# Patient Record
Sex: MALE | Race: BLACK OR AFRICAN AMERICAN
[De-identification: names, ages, dates, MRNs, and addresses within clinical notes are randomized per-mention and may not be internally consistent; named-entity substitution may affect disease eponyms.]

---

## 2021-09-19 ENCOUNTER — HOSPITAL ENCOUNTER (EMERGENCY)
Dept: HOSPITAL 63 - ER | Age: 46
Discharge: HOME | End: 2021-09-19
Payer: SELF-PAY

## 2021-09-19 VITALS — BODY MASS INDEX: 27.84 KG/M2 | WEIGHT: 216.93 LBS | HEIGHT: 74 IN

## 2021-09-19 VITALS — DIASTOLIC BLOOD PRESSURE: 59 MMHG | SYSTOLIC BLOOD PRESSURE: 111 MMHG
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
## 2021-09-19 DIAGNOSIS — I10: ICD-10-CM

## 2021-09-19 DIAGNOSIS — E78.00: ICD-10-CM

## 2021-09-19 DIAGNOSIS — F10.20: Primary | ICD-10-CM

## 2021-09-19 LAB
ACETAMIN: < 2 MCG/ML (ref 10–30)
ALBUMIN SERPL-MCNC: 4.7 G/DL (ref 3.4–5)
ALBUMIN/GLOB SERPL: 1.3 {RATIO} (ref 1–1.7)
ALP SERPL-CCNC: 96 U/L (ref 46–116)
ALT SERPL-CCNC: 102 U/L (ref 16–63)
AMPHETAMINE/METHAMPHETAMINE: (no result)
ANION GAP SERPL CALC-SCNC: 29 MMOL/L (ref 6–14)
APTT PPP: YELLOW S
AST SERPL-CCNC: 66 U/L (ref 15–37)
BACTERIA #/AREA URNS HPF: 0 /HPF
BARBITURATES UR-MCNC: (no result) UG/ML
BASOPHILS # BLD AUTO: 0 X10^3/UL (ref 0–0.2)
BASOPHILS NFR BLD: 1 % (ref 0–3)
BENZODIAZ UR-MCNC: (no result) UG/L
BILIRUB SERPL-MCNC: 0.6 MG/DL (ref 0.2–1)
BILIRUB UR QL STRIP: (no result)
BUN/CREAT SERPL: 11 (ref 6–20)
CA-I SERPL ISE-MCNC: 13 MG/DL (ref 8–26)
CALCIUM SERPL-MCNC: 10.4 MG/DL (ref 8.5–10.1)
CANNABINOIDS UR-MCNC: (no result) UG/L
CHLORIDE SERPL-SCNC: 97 MMOL/L (ref 98–107)
CO2 SERPL-SCNC: 14 MMOL/L (ref 21–32)
COCAINE UR-MCNC: (no result) NG/ML
CREAT SERPL-MCNC: 1.2 MG/DL (ref 0.7–1.3)
EOSINOPHIL NFR BLD: 0 % (ref 0–3)
EOSINOPHIL NFR BLD: 0 X10^3/UL (ref 0–0.7)
ERYTHROCYTE [DISTWIDTH] IN BLOOD BY AUTOMATED COUNT: 15.6 % (ref 11.5–14.5)
ETHANOL SERPL-MCNC: 38 MG/DL (ref 0–10)
FIBRINOGEN PPP-MCNC: (no result) MG/DL
GFR SERPLBLD BASED ON 1.73 SQ M-ARVRAT: 79.2 ML/MIN
GLOBULIN SER-MCNC: 3.6 G/DL (ref 2.2–3.8)
GLUCOSE SERPL-MCNC: 137 MG/DL (ref 70–99)
GLUCOSE UR STRIP-MCNC: (no result) MG/DL
HCT VFR BLD CALC: 48.1 % (ref 39–53)
HGB BLD-MCNC: 16 G/DL (ref 13–17.5)
HYALINE CASTS #/AREA URNS LPF: (no result) /HPF
LIPASE: 92 U/L (ref 73–393)
LYMPHOCYTES # BLD: 1.3 X10^3/UL (ref 1–4.8)
LYMPHOCYTES NFR BLD AUTO: 20 % (ref 24–48)
MCH RBC QN AUTO: 31 PG (ref 25–35)
MCHC RBC AUTO-ENTMCNC: 33 G/DL (ref 31–37)
MCV RBC AUTO: 94 FL (ref 79–100)
METHADONE SERPL-MCNC: (no result) NG/ML
MONO #: 0.3 X10^3/UL (ref 0–1.1)
MONOCYTES NFR BLD: 6 % (ref 0–9)
NEUT #: 4.6 X10^3UL (ref 1.8–7.7)
NEUTROPHILS NFR BLD AUTO: 73 % (ref 31–73)
NITRITE UR QL STRIP: (no result)
OPIATES UR-MCNC: (no result) NG/ML
PCP SERPL-MCNC: (no result) MG/DL
PLATELET # BLD AUTO: 258 X10^3/UL (ref 140–400)
POTASSIUM SERPL-SCNC: 5 MMOL/L (ref 3.5–5.1)
PROT SERPL-MCNC: 8.3 G/DL (ref 6.4–8.2)
RBC # BLD AUTO: 5.12 X10^6/UL (ref 4.3–5.7)
RBC #/AREA URNS HPF: (no result) /HPF (ref 0–2)
SALIC: 3.2 MG/DL (ref 2.8–20)
SODIUM SERPL-SCNC: 140 MMOL/L (ref 136–145)
SP GR UR STRIP: >=1.03
SQUAMOUS #/AREA URNS LPF: (no result) /LPF
UROBILINOGEN UR-MCNC: 0.2 MG/DL
WBC # BLD AUTO: 6.3 X10^3/UL (ref 4–11)
WBC #/AREA URNS HPF: (no result) /HPF (ref 0–4)

## 2021-09-19 PROCEDURE — 82947 ASSAY GLUCOSE BLOOD QUANT: CPT

## 2021-09-19 PROCEDURE — 99284 EMERGENCY DEPT VISIT MOD MDM: CPT

## 2021-09-19 PROCEDURE — 80053 COMPREHEN METABOLIC PANEL: CPT

## 2021-09-19 PROCEDURE — 80307 DRUG TEST PRSMV CHEM ANLYZR: CPT

## 2021-09-19 PROCEDURE — 81001 URINALYSIS AUTO W/SCOPE: CPT

## 2021-09-19 PROCEDURE — 96375 TX/PRO/DX INJ NEW DRUG ADDON: CPT

## 2021-09-19 PROCEDURE — 36415 COLL VENOUS BLD VENIPUNCTURE: CPT

## 2021-09-19 PROCEDURE — 96365 THER/PROPH/DIAG IV INF INIT: CPT

## 2021-09-19 PROCEDURE — G0480 DRUG TEST DEF 1-7 CLASSES: HCPCS

## 2021-09-19 PROCEDURE — 85025 COMPLETE CBC W/AUTO DIFF WBC: CPT

## 2021-09-19 PROCEDURE — 80329 ANALGESICS NON-OPIOID 1 OR 2: CPT

## 2021-09-19 PROCEDURE — 83690 ASSAY OF LIPASE: CPT

## 2021-09-19 NOTE — PHYS DOC
Adult General


HPI


HPI





Patient is a 45-year-old male presenting for sequelae alcohol dependence.  

Reports he has history of high blood pressure and hypercholesterol for which he 

had stopped taking all medications for due to losing insurance coverage 

recently.  Also admits history of alcohol abuse for past 25 years.  Reports his 

usage has waxed and waned but admits increased life stressors recently causing 

him to increase his frequency and consumption.  Reports he typically drinks of 

vodka, admits he buys small airplane bottles as he knows if he buys a big bottle

he has no self-control.  Admits during history that at times he feels like he 

has no will to live and that he would be better off dead, denies any specific 

plans or homicidal intentions.  Presents today as last drink was over 24 hours 

ago when he feels shaky, confused, weak and nauseous.  He admits having several 

episodes of nonbloody nonbilious emesis.  He is here requesting help today.  

Denies any fever, URI symptoms, chest pain, ripping or tearing sensation in the 

torso, shortness of breath or cough, abdominal pain, bladder or bowel 

incontinence, falls, changes in motor or sensory or neuro function





Review of Systems


Review of Systems


Fourteen body systems of review of systems have been reviewed. See HPI for 

pertinent positives and negative responses, other wise all other systems are 

negative, non-pertinent or non-contributory





Physical Exam


Physical Exam


Constitutional: Well developed, well nourished, no acute distress, non-toxic 

appearance. 


HENT: Normocephalic, atraumatic, bilateral external ears normal, oropharynx 

moist, no oral exudates, nose normal. 


Eyes: PERRLA, EOMI, conjunctiva normal, no discharge.  


Neck: Normal range of motion, no tenderness, supple, no stridor.  


Cardiovascular: Heart rate regular, sinus rhythm, no murmurs rubs or gallops


Lungs & Thorax:  Bilateral breath sounds clear to auscultation 


Abdomen: Bowel sounds normal, soft, no tenderness, no masses, no pulsatile 

masses.  Nonsurgical abdomen, no peritoneal signs


Skin: Warm, dry, no erythema, no rash.  


Back: No tenderness, no CVA tenderness.  


Extremities: No tenderness, no cyanosis, no clubbing, ROM intact, no edema.  


Neurologic: Alert and oriented X 3, grossly normal motor & sensory function, no 

focal deficits noted. 


Psychologic: Affect normal, judgement normal, mood normal.





Current Patient Data


Vital Signs





Vital Signs








  Date Time  Temp Pulse Resp B/P (MAP) Pulse Ox O2 Delivery O2 Flow Rate FiO2


 


9/19/21 08:07 98.1 130 20 111/59 (76) 97 Room Air  








Vital Signs








  Date Time  Temp Pulse Resp B/P (MAP) Pulse Ox O2 Delivery O2 Flow Rate FiO2


 


9/19/21 08:07 98.1 130 20 111/59 (76) 97 Room Air  








Lab Results





Laboratory Tests








Test


 9/19/21


08:00 9/19/21


08:03 9/19/21


09:15


 


White Blood Count 6.3 x10^3/uL   


 


Red Blood Count 5.12 x10^6/uL   


 


Hemoglobin 16.0 g/dL   


 


Hematocrit 48.1 %   


 


Mean Corpuscular Volume 94 fL   


 


Mean Corpuscular Hemoglobin 31 pg   


 


Mean Corpuscular Hemoglobin


Concent 33 g/dL 


 


 





 


Red Cell Distribution Width 15.6 %   


 


Platelet Count 258 x10^3/uL   


 


Neutrophils (%) (Auto) 73 %   


 


Lymphocytes (%) (Auto) 20 %   


 


Monocytes (%) (Auto) 6 %   


 


Eosinophils (%) (Auto) 0 %   


 


Basophils (%) (Auto) 1 %   


 


Neutrophils # (Auto) 4.6 x10^3uL   


 


Lymphocytes # (Auto) 1.3 x10^3/uL   


 


Monocytes # (Auto) 0.3 x10^3/uL   


 


Eosinophils # (Auto) 0.0 x10^3/uL   


 


Basophils # (Auto) 0.0 x10^3/uL   


 


Sodium Level 140 mmol/L   


 


Potassium Level 5.0 mmol/L   


 


Chloride Level 97 mmol/L   


 


Carbon Dioxide Level 14 mmol/L   


 


Anion Gap 29   


 


Blood Urea Nitrogen 13 mg/dL   


 


Creatinine 1.2 mg/dL   


 


Estimated GFR


(Cockcroft-Gault) 79.2 


 


 





 


BUN/Creatinine Ratio 11   


 


Glucose Level 137 mg/dL   


 


Calcium Level 10.4 mg/dL   


 


Total Bilirubin 0.6 mg/dL   


 


Aspartate Amino Transf


(AST/SGOT) 66 U/L 


 


 





 


Alanine Aminotransferase


(ALT/SGPT) 102 U/L 


 


 





 


Alkaline Phosphatase 96 U/L   


 


Total Protein 8.3 g/dL   


 


Albumin 4.7 g/dL   


 


Albumin/Globulin Ratio 1.3   


 


Lipase 92 U/L   


 


Salicylates Level 3.2 mg/dL   


 


Salicylate Last Dose Date Unknown   


 


Salicylate Last Dose Time Unknown   


 


Acetaminophen Level < 2 mcg/mL   


 


Acetaminophen Last Dose Date Unknown   


 


Acetaminophen Last Dose Time Unknown   


 


Ethyl Alcohol Level 38 mg/dL   


 


Glucose (Fingerstick)  147 mg/dL  


 


Urine Collection Type   Unknown 


 


Urine Color   Yellow 


 


Urine Clarity   Hazy 


 


Urine pH   5.0 


 


Urine Specific Gravity   >=1.030 


 


Urine Protein   100 mg/dl 


 


Urine Glucose (UA)   Neg mg/dL 


 


Urine Ketones (Stick)   80 mg/dL 


 


Urine Blood   Mod 


 


Urine Nitrite   Neg 


 


Urine Bilirubin   Neg 


 


Urine Urobilinogen Dipstick   0.2 mg/dL 


 


Urine Leukocyte Esterase   Neg 


 


Urine RBC   1-2 /HPF 


 


Urine WBC   Occ /HPF 


 


Urine Squamous Epithelial


Cells 


 


 Few /LPF 





 


Urine Bacteria   0 /HPF 


 


Urine Hyaline Casts   Occ /HPF 


 


Urine Mucus   Mod /LPF 


 


Urine Opiates Screen   Neg 


 


Urine Methadone Screen   Neg 


 


Urine Barbiturates   Neg 


 


Urine Phencyclidine Screen   Neg 


 


Urine


Amphetamine/Methamphetamine 


 


 Neg 





 


Urine Benzodiazepines Screen   Neg 


 


Urine Cocaine Screen   Neg 


 


Urine Cannabinoids Screen   Neg 


 


Urine Ethyl Alcohol   Pos 








Current Medications








 Medications


  (Trade)  Dose


 Ordered  Sig/Michael


 Route


 PRN Reason  Start Time


 Stop Time Status Last Admin


Dose Admin


 


 Ondansetron HCl


  (Zofran)  4 mg  STK-MED ONCE


 .ROUTE


   9/19/21 07:57


 9/19/21 07:57 DC  





 


 Lorazepam


  (Ativan Inj)  1 mg  1X  ONCE


 IVP


   9/19/21 08:15


 9/19/21 08:16 DC 9/19/21 08:34





 


 Sodium Chloride  1,000 ml @ 


 1,000 mls/hr  1X  ONCE


 IV


   9/19/21 08:15


 9/19/21 09:14 DC 9/19/21 08:33





 


 Folic Acid


  (Folic Acid)  1 mg  1X  ONCE


 PO


   9/19/21 08:15


 9/19/21 08:16 DC 9/19/21 08:34





 


 Thiamine HCl 100


 mg/Sodium Chloride  51 ml @ 


 102 mls/hr  DAILY


 IV


   9/19/21 09:00


    9/19/21 08:34





 


 Sodium Chloride  50 ml @ As


 Directed  STK-MED ONCE


 .ROUTE


   9/19/21 08:23


 9/19/21 08:23 DC  





 


 Thiamine HCl


  (Thiamine Vial)  200 mg  STK-MED ONCE


 IV


   9/19/21 08:23


 9/19/21 08:23 DC  














EKG


EKG


[]





Radiology/Procedures


Radiology/Procedures


[]





Heart Score


C/O Chest Pain:  No


Risk Factors:


Risk Factors:  DM, Current or recent (<one month) smoker, HTN, HLP, family 

history of CAD, obesity.


Risk Scores:


Risk Factors:  DM, Current or recent (<one month) smoker, HTN, HLP, family 

history of CAD, obesity.





Course & Med Decision Making


Course & Med Decision Making


ABCs unremarkable


HPI physical exam and comprehensive ER work-up nonconcerning for any emergent or

 surgical issues


Patient likely suffering from mild withdrawals from alcohol as he has not drank 

in last 24 hours, he is a never experienced seizures or full withdrawal such as 

delirium tremens


Patient endorsed sad thoughts and feelings that he would be better off dead and 

so, PAT team came and evaluated patient


Patient evaluated by qualified mental health professional who reviewed any 

appropriate supporting documentation and previous available medical records and 

feels patient does not meet criteria for admission to a mental health facility.


A safety plan was created which patient fully brought into and he was instructed

 to contact their services tomorrow for continued close outpatient follow-up and

 plans to attend outpatient alcohol detox.


Patient educated on importance of continued supportive care and discussed that 

abrupt cessation from alcohol can be life-threatening.  As such, short-term plan

 of care discussed with strict return precautions that were verbally understood 

by patient, all questions and concerns addressed prior to departure





Dragon Disclaimer


Dragon Disclaimer


This electronic medical record was generated, in whole or in part, using a voice

 recognition dictation system.





Departure


Departure:


Impression:  


   Primary Impression:  


   EtOH dependence


Disposition:  01 HOME / SELF CARE / HOMELESS


Condition:  STABLE


Referrals:  


PCPCHANO (PCP)





Additional Instructions:  


You were seen for alcohol dependence.  Your vitals, physical examination and 

comprehensive ER work-up was nonconcerning for any emergent or surgical issues. 

 You were seen by our behavioral health team and safety plan was created, it is 

pertinent that you follow through with this and contact them tomorrow as 

instructed.  As abrupt cessation of alcohol can be fatal, you should refer to 

the resource sheet for help in stopping your addiction with help in a controlled

 environment. You should return to the ED if you develop any new or concerning 

symptoms.











JAMES MARCUS DO                 Sep 19, 2021 07:56

## 2021-12-26 ENCOUNTER — HOSPITAL ENCOUNTER (INPATIENT)
Dept: HOSPITAL 63 - ER | Age: 46
LOS: 1 days | Discharge: HOME | DRG: 313 | End: 2021-12-27
Attending: HOSPITALIST | Admitting: HOSPITALIST
Payer: SELF-PAY

## 2021-12-26 VITALS — SYSTOLIC BLOOD PRESSURE: 174 MMHG | DIASTOLIC BLOOD PRESSURE: 110 MMHG

## 2021-12-26 VITALS — DIASTOLIC BLOOD PRESSURE: 107 MMHG | SYSTOLIC BLOOD PRESSURE: 162 MMHG

## 2021-12-26 VITALS — SYSTOLIC BLOOD PRESSURE: 167 MMHG | DIASTOLIC BLOOD PRESSURE: 111 MMHG

## 2021-12-26 VITALS — WEIGHT: 232.59 LBS | BODY MASS INDEX: 30.83 KG/M2 | HEIGHT: 73 IN

## 2021-12-26 VITALS — DIASTOLIC BLOOD PRESSURE: 109 MMHG | SYSTOLIC BLOOD PRESSURE: 163 MMHG

## 2021-12-26 DIAGNOSIS — E11.9: ICD-10-CM

## 2021-12-26 DIAGNOSIS — I24.8: ICD-10-CM

## 2021-12-26 DIAGNOSIS — R07.89: Primary | ICD-10-CM

## 2021-12-26 DIAGNOSIS — F32.A: ICD-10-CM

## 2021-12-26 DIAGNOSIS — Z59.7: ICD-10-CM

## 2021-12-26 DIAGNOSIS — Z91.14: ICD-10-CM

## 2021-12-26 DIAGNOSIS — E78.5: ICD-10-CM

## 2021-12-26 DIAGNOSIS — I15.8: ICD-10-CM

## 2021-12-26 DIAGNOSIS — K21.9: ICD-10-CM

## 2021-12-26 DIAGNOSIS — F17.210: ICD-10-CM

## 2021-12-26 DIAGNOSIS — F41.8: ICD-10-CM

## 2021-12-26 DIAGNOSIS — Z82.49: ICD-10-CM

## 2021-12-26 DIAGNOSIS — Z91.19: ICD-10-CM

## 2021-12-26 DIAGNOSIS — M19.90: ICD-10-CM

## 2021-12-26 DIAGNOSIS — F41.9: ICD-10-CM

## 2021-12-26 DIAGNOSIS — F10.20: ICD-10-CM

## 2021-12-26 DIAGNOSIS — K29.20: ICD-10-CM

## 2021-12-26 DIAGNOSIS — Z20.822: ICD-10-CM

## 2021-12-26 LAB
ALBUMIN SERPL-MCNC: 4.3 G/DL (ref 3.4–5)
ALP SERPL-CCNC: 79 U/L (ref 46–116)
ALT SERPL-CCNC: 58 U/L (ref 16–63)
AMPHETAMINE/METHAMPHETAMINE: (no result)
ANION GAP SERPL CALC-SCNC: 25 MMOL/L (ref 6–14)
APTT PPP: YELLOW S
AST SERPL-CCNC: 53 U/L (ref 15–37)
BACTERIA #/AREA URNS HPF: 0 /HPF
BARBITURATES UR-MCNC: (no result) UG/ML
BASOPHILS # BLD AUTO: 0 X10^3/UL (ref 0–0.2)
BASOPHILS NFR BLD: 1 % (ref 0–3)
BENZODIAZ UR-MCNC: (no result) UG/L
BILIRUB DIRECT SERPL-MCNC: 0.1 MG/DL (ref 0–0.2)
BILIRUB SERPL-MCNC: 0.6 MG/DL (ref 0.2–1)
BILIRUB UR QL STRIP: (no result)
CA-I SERPL ISE-MCNC: 24 MG/DL (ref 8–26)
CALCIUM SERPL-MCNC: 8.6 MG/DL (ref 8.5–10.1)
CANNABINOIDS UR-MCNC: (no result) UG/L
CHLORIDE SERPL-SCNC: 98 MMOL/L (ref 98–107)
CHOLEST/HDLC SERPL: 7 {RATIO}
CO2 SERPL-SCNC: 16 MMOL/L (ref 21–32)
COCAINE UR-MCNC: (no result) NG/ML
CREAT SERPL-MCNC: 1.3 MG/DL (ref 0.7–1.3)
EOSINOPHIL NFR BLD: 0 % (ref 0–3)
EOSINOPHIL NFR BLD: 0 X10^3/UL (ref 0–0.7)
ERYTHROCYTE [DISTWIDTH] IN BLOOD BY AUTOMATED COUNT: 14.6 % (ref 11.5–14.5)
FIBRINOGEN PPP-MCNC: CLEAR MG/DL
GFR SERPLBLD BASED ON 1.73 SQ M-ARVRAT: 71.9 ML/MIN
GLUCOSE SERPL-MCNC: 148 MG/DL (ref 70–99)
GLUCOSE UR STRIP-MCNC: (no result) MG/DL
HCT VFR BLD CALC: 49.5 % (ref 39–53)
HDLC SERPL-MCNC: 47 MG/DL (ref 40–60)
HGB BLD-MCNC: 16.3 G/DL (ref 13–17.5)
HYALINE CASTS #/AREA URNS LPF: (no result) /HPF
LDLC: (no result) MG/DL (ref 0–100)
LIPASE: 109 U/L (ref 73–393)
LYMPHOCYTES # BLD: 2.4 X10^3/UL (ref 1–4.8)
LYMPHOCYTES NFR BLD AUTO: 33 % (ref 24–48)
MAGNESIUM SERPL-MCNC: 2.3 MG/DL (ref 1.8–2.4)
MCH RBC QN AUTO: 30 PG (ref 25–35)
MCHC RBC AUTO-ENTMCNC: 33 G/DL (ref 31–37)
MCV RBC AUTO: 91 FL (ref 79–100)
METHADONE SERPL-MCNC: (no result) NG/ML
MONO #: 0.6 X10^3/UL (ref 0–1.1)
MONOCYTES NFR BLD: 9 % (ref 0–9)
NEUT #: 4.2 X10^3UL (ref 1.8–7.7)
NEUTROPHILS NFR BLD AUTO: 58 % (ref 31–73)
NITRITE UR QL STRIP: (no result)
OPIATES UR-MCNC: (no result) NG/ML
PCP SERPL-MCNC: (no result) MG/DL
PLATELET # BLD AUTO: 211 X10^3/UL (ref 140–400)
POTASSIUM SERPL-SCNC: 4.4 MMOL/L (ref 3.5–5.1)
PROT SERPL-MCNC: 8.5 G/DL (ref 6.4–8.2)
RBC # BLD AUTO: 5.42 X10^6/UL (ref 4.3–5.7)
RBC #/AREA URNS HPF: (no result) /HPF (ref 0–2)
SODIUM SERPL-SCNC: 139 MMOL/L (ref 136–145)
SP GR UR STRIP: >=1.03
SQUAMOUS #/AREA URNS LPF: (no result) /LPF
TRIGL SERPL-MCNC: 519 MG/DL (ref 0–150)
UROBILINOGEN UR-MCNC: 0.2 MG/DL
VLDLC: 103 MG/DL (ref 0–40)
WBC # BLD AUTO: 7.2 X10^3/UL (ref 4–11)
WBC #/AREA URNS HPF: (no result) /HPF (ref 0–4)

## 2021-12-26 PROCEDURE — 85730 THROMBOPLASTIN TIME PARTIAL: CPT

## 2021-12-26 PROCEDURE — U0003 INFECTIOUS AGENT DETECTION BY NUCLEIC ACID (DNA OR RNA); SEVERE ACUTE RESPIRATORY SYNDROME CORONAVIRUS 2 (SARS-COV-2) (CORONAVIRUS DISEASE [COVID-19]), AMPLIFIED PROBE TECHNIQUE, MAKING USE OF HIGH THROUGHPUT TECHNOLOGIES AS DESCRIBED BY CMS-2020-01-R: HCPCS

## 2021-12-26 PROCEDURE — 85610 PROTHROMBIN TIME: CPT

## 2021-12-26 PROCEDURE — 80307 DRUG TEST PRSMV CHEM ANLYZR: CPT

## 2021-12-26 PROCEDURE — 83880 ASSAY OF NATRIURETIC PEPTIDE: CPT

## 2021-12-26 PROCEDURE — 87426 SARSCOV CORONAVIRUS AG IA: CPT

## 2021-12-26 PROCEDURE — 84484 ASSAY OF TROPONIN QUANT: CPT

## 2021-12-26 PROCEDURE — 85025 COMPLETE CBC W/AUTO DIFF WBC: CPT

## 2021-12-26 PROCEDURE — 84443 ASSAY THYROID STIM HORMONE: CPT

## 2021-12-26 PROCEDURE — 96375 TX/PRO/DX INJ NEW DRUG ADDON: CPT

## 2021-12-26 PROCEDURE — 80061 LIPID PANEL: CPT

## 2021-12-26 PROCEDURE — 96361 HYDRATE IV INFUSION ADD-ON: CPT

## 2021-12-26 PROCEDURE — 82550 ASSAY OF CK (CPK): CPT

## 2021-12-26 PROCEDURE — 71045 X-RAY EXAM CHEST 1 VIEW: CPT

## 2021-12-26 PROCEDURE — 36415 COLL VENOUS BLD VENIPUNCTURE: CPT

## 2021-12-26 PROCEDURE — 96374 THER/PROPH/DIAG INJ IV PUSH: CPT

## 2021-12-26 PROCEDURE — 81001 URINALYSIS AUTO W/SCOPE: CPT

## 2021-12-26 PROCEDURE — 93005 ELECTROCARDIOGRAM TRACING: CPT

## 2021-12-26 PROCEDURE — 82150 ASSAY OF AMYLASE: CPT

## 2021-12-26 PROCEDURE — 80076 HEPATIC FUNCTION PANEL: CPT

## 2021-12-26 PROCEDURE — 83735 ASSAY OF MAGNESIUM: CPT

## 2021-12-26 PROCEDURE — 80048 BASIC METABOLIC PNL TOTAL CA: CPT

## 2021-12-26 PROCEDURE — 96372 THER/PROPH/DIAG INJ SC/IM: CPT

## 2021-12-26 PROCEDURE — 85379 FIBRIN DEGRADATION QUANT: CPT

## 2021-12-26 PROCEDURE — 83690 ASSAY OF LIPASE: CPT

## 2021-12-26 SDOH — ECONOMIC STABILITY - INCOME SECURITY: INSUFFICIENT SOCIAL INSURANCE AND WELFARE SUPPORT: Z59.7

## 2021-12-26 NOTE — PHYS DOC
Past History


Past Medical History:  Alcoholism, Anxiety, Arthritis, GERD, Hypertension


 (RUSTY KELLOGG MD)


Past Surgical History:  No Surgical History


 (RUSTY KELLOGG MD)


Smoking:  Cigarettes


Alcohol Use:  Heavy


 (RUSTY KELLOGG MD)





General Adult


EDM:


Chief Complaint:  CHEST PAIN





HPI:


HPI:


"  I having a lot of chest tightness... "





Patient is a 46 year old male who presents with above hx  and complaints of 

chest discomfort.  Patient states discomfort is spread over the center of his 

chest.  Does not seem to radiate into the neck or arms.  Patient states he is 

very dyspneic and nauseated this morning.  Patient does not admit to drinking 

through the holidays..  Patient does have a history of alcohol dependence has 

past medical history of hypertension, alcohol withdrawal, GERD, borderline 

diabetic, anxiety and depression.  Patient not currently following with any 

local doctors.  No recent travel.  No specific ill contacts.  No history of bad 

food intake.


 (RUSTY KELLOGG MD)





Review of Systems:


Review of Systems:


Constitutional:  Denies fever or chills 


Eyes:  Denies change in visual acuity 


HENT:  Denies nasal congestion or sore throat 


Respiratory: Complains of cough and shortness of breath 


Cardiovascular: Complains of chest pain 


GI:  Denies abdominal pain, nausea, vomiting, bloody stools or diarrhea 


: Denies dysuria 


Musculoskeletal:  Denies back pain or joint pain 


Integument:  Denies rash 


Neurologic:  Denies headache, focal weakness or sensory changes 


Endocrine:  Denies polyuria or polydipsia 


Lymphatic:  Denies swollen glands 


Psychiatric:  Denies depression or anxiety


 (RUSTY KELLOGG MD)





Family History:


Family History:


Noncontributory to presentation


 (RUSTY KELLOGG MD)





Current Medications:


Current Meds:


See nursing for home meds


 (RUSTY KELLOGG MD)





Allergies:


Allergies:





Allergies








Coded Allergies Type Severity Reaction Last Updated Verified


 


  No Known Drug Allergies    21 No








 (RUSTY KELLOGG MD)





Physical Exam:


PE:





Constitutional: Moderate acute distress, moderate intoxication in appearance. []


HENT: Normocephalic, atraumatic, bilateral external ears normal, oropharynx 

moist, no oral exudates, nose normal. []


Eyes: PERRLA, EOMI, conjunctiva normal, no discharge. [] 


Neck: Normal range of motion, no tenderness, supple, no stridor. [] 


Cardiovascular: Tachycardia heart rate regular rhythm, no murmur [.  Bedside 

monitor shows a sinus tachycardia with some J-point elevation]


Lungs & Thorax:  Bilateral breath sounds equal apex with scattered wheezes and 

basilar crackles auscultation []


Abdomen: Bowel sounds normal, soft, no tenderness, no masses, no pulsatile 

masses. [] 


Skin: Warm, diaphoretic, no erythema, no rash. [] 


Back: No tenderness, no CVA tenderness. [] 


Extremities: No tenderness, no cyanosis, no clubbing, ROM intact, ankle edema.  

No cording appreciated


Neurologic: Alert and oriented X 3, normal motor function, normal sensory 

function, no focal deficits noted. []


Psychologic: Affect anxious , judgement normal, mood normal. []


 (RUSTY KELLOGG MD)





EKG:


EKG:


My interpretation EKG shows a sinus tachycardia 118 bpm.  Does have anterior 

septal contour changes and T wave abnormality in the anterior lateral leads.  

But no findings of acute STEMI of contralateral changes.  There is some wavering

 baseline because of patient's respiratory effort.  Time of EKG is 0413 hrs. 





My interpretation second EKG shows a sinus tachycardia 114 bpm.  Left atrial 

changes.  There is some mild elevation ST segments in V1 and 2 time of EKG is 

0618 hrs. []


 (RUSTY KELLOGG MD)





Radiology/Procedures:


Radiology/Procedures:


[][facility] [address], [place] [address]


                                 [phone]


                                        


                                 IMAGING REPORT





                                     Signed





PATIENT: KATELYN ZAMORA   ACCOUNT: [id number]     MRN#: [id number]


: 1975           LOCATION: ER              AGE: 46


SEX: M                    EXAM DT: 21         ACCESSION#: [id number]


STATUS: REG ER            ORD. PHYSICIAN: RUSTY KELLOGG MD


REASON: Chest pain


PROCEDURE: PORTABLE CHEST 1V





AP chest x-ray





HISTORY: Chest pain.





FINDINGS: Heart size normal. Mediastinal silhouette is normal. No pneumothorax, 

pulmonary opacities or pleural effusions. Bones are unremarkable.





IMPRESSION: No acute process.





Electronically signed by: Moises Coppola MD (2021 5:07 AM) [facility]














DICTATED AND SIGNED BY:     MOISES COPPOLA MD


DATE:     21 0506





CC: RUSTY KELLOGG MD; PCP,NO ~MTH0 0


 (RUSTY KELLOGG MD)





Heart Score:


C/O Chest Pain:  Yes


HEART Score for Chest Pain:  








HEART Score for Chest Pain Response (Comments) Value


 


History Moderately Suspicious 1


 


ECG Nonspecific Repolarizatio 1


 


Age >45 - < 65 1


 


Risk Factors >3 Risk Factors or Hx CAD 2


 


Troponin >1-<3x Normal Limit 1


 


Total  6








Risk Factors:


Risk Factors:  DM, Current or recent (<one month) smoker, HTN, HLP, family 

history of CAD, obesity.


Risk Scores:


Score 0 - 3:  2.5% MACE over next 6 weeks - Discharge Home


Score 4 - 6:  20.3% MACE over next 6 weeks - Admit for Clinical Observation


Score 7 - 10:  72.7% MACE over next 6 weeks - Early Invasive Strategies


 (RUSTY KELLOGG MD)





Course & Med Decision Making:


Course & Med Decision Making


Pertinent Labs and Imaging studies reviewed. (See chart for details)








Discussed presentation, testing and tx plan with Dr. Boswell.   Pt. admit to his 

service.  ED Hold until tele bed.  Open in [facility].  Asked  to have 

cardiology see patient in ED on the admission orders





Impression:





1. Chest Pain


2.  Accelerated HTN


3.  Elevated Trop  70


4.  DM - Gluc 148


5.  Alcohol Abuse  - currently 72


6.  Tobacco Abuse


7.  Elevated D-dimer 0.58


8.  Elevated 





Endorsed to Dr. Marcus at shift change.   Urine Tox screen pending. COVID screen 

pending.





Critical Care- 60 min.  Multiple calls, orders for acute MI








Endorsed to Dr. Macrus at shift change:


[]


 (RUSTY KELLOGG MD)


Course & Med Decision Making


I assumed care of patient after comprehensive signout from off going physician. 

 I reviewed entirety of ER work-up so far in a patient who was previously 

admitted under the care of Dr. Boswell


No acute events during my daytime shift.  Patient experiencing alcohol 

withdrawals and responded to IV Ativan.  Dr. Avery, cardiologist, saw patient

 while in ER with no indication for emergent cath etc.


Patient ultimately transferred to [facility] for inpatient admission via 

EMS


 (JAMES MARCUS DO)


Nelson Disclaimer:


Dragon Disclaimer:


This electronic medical record was generated, in whole or in part, using a voice

 recognition dictation system.


 (RUSTY KELLOGG MD)





Departure


Departure:


Impression:  


   Primary Impression:  


   Chest pain


   Additional Impression:  


   Alcohol dependence


Disposition:  09 ADMITTED AS INPATIENT


Admitting Physician:  Sunil Boswell


 (JAMES MARCUS DO)


Condition:  STABLE


Referrals:  


PCP,NO (PCP)


Scripts


No Active Prescriptions or Reported Meds





Dragon Disclaimer


This chart was dictated in whole or in part using Voice Recognition software in 

a busy, high-work load, and often noisy Emergency Department environment.  It 

may contain unintended and wholly unrecognized errors or omissions.


 (RUSTY KELLOGG MD)





Dragon Disclaimer


This chart was dictated in whole or in part using Voice Recognition software in 

a busy, high-work load, and often noisy Emergency Department environment.  It 

may contain unintended and wholly unrecognized errors or omissions.


 (RUSTY KELLOGG MD)





Dragon Disclaimer


This chart was dictated in whole or in part using Voice Recognition software in 

a busy, high-work load, and often noisy Emergency Department environment.  It 

may contain unintended and wholly unrecognized errors or omissions.


 (RUSTY KELLOGG MD)











RUSTY KELLOGG MD           Dec 26, 2021 04:07


JAMES MARCUS DO                 Dec 29, 2021 06:11

## 2021-12-26 NOTE — RAD
AP chest x-ray



HISTORY: Chest pain.



FINDINGS: Heart size normal. Mediastinal silhouette is normal. No pneumothorax, pulmonary opacities o
r pleural effusions. Bones are unremarkable.



IMPRESSION: No acute process.



Electronically signed by: Oswaldo Coppola MD (12/26/2021 5:07 AM) [facility]

## 2021-12-26 NOTE — EKG
[facility] [address], [place] [address]

Test Date:    2021               Test Time:    04:13:19

Pat Name:     KATELYN ZAMORA           Department:   

Patient ID:   [id number]           Room:          

Gender:       M                        Technician:   

:          1975               Requested By: RUSTY KELLOGG

Order Number: [id number]            Reading MD:   Emmanuel Grey MD

                                 Measurements

Intervals                              Axis          

Rate:         118                      P:            82

IL:           154                      QRS:          19

QRSD:         92                       T:            37

QT:           328                                    

QTc:          462                                    

                           Interpretive Statements

SINUS TACHYCARDIA

NON-SPECIFIC ST/T CHANGES

Electronically Signed On 2021 9:26:53 CST by Emmanuel Grey MD

## 2021-12-26 NOTE — NUR
PATIENT ARRIVED TO UNIT VIA EMS. PT IS ORIENTED TO ROOM AND PROCEDURES. PT IS OFFERED FOOD 
AND DRINK. PT IS RESTING IN BED AT THIS TIME. DR OLIVEIRA NOTIFIED OF ADMISSION AND VS OBTAINED. 
WILL CONTINUE TO MONITOR.

## 2021-12-26 NOTE — PDOC2
CONSULT


DOS:


DATE: 12/26/21 


TIME: 14:44


Reason for Consult:


Chest pain


Referring Physician:


Dr. Boswell


Chief Complaint


Chest pain


Source:  Chart review, Patient


Problem List


Problems


Medical Problems:


(1) Chest pain


Status: Acute  








History of Present Illness


46-year-old male with history of hypertension and alcohol abuse presented with 

retrosternal chest pain that he described as sharp in nature without any radi

ation that started after he had nausea and vomiting.  He did admit to drinking 

heavy amounts of alcohol preceding this episode.  He denied any orthopnea/PND, 

palpitations or syncope.  He has history of hypertension and was previously 

taking multiple antihypertensives but stopped taking them after he lost 

insurance.


Past Medical History


Chronic alcohol abuse


Hypertension


Hyperlipidemia


Anxiety disorder


GERD


Past Surgical History:  No pertinent history


Family History


Hypertension


Social History


Patient has history of heavy alcohol abuse and smokes 3/4th pack of cigarettes 

daily and denied any drug abuse.


Current Medications





Current Medications


Aspirin (Aspirin Chewable) 324 mg 1X  ONCE PO  Last administered on 12/26/21at 

04:15;  Start 12/26/21 at 04:15;  Stop 12/26/21 at 04:16;  Status DC


Lactated Ringer's 1,000 ml @  100 mls/hr Q10H IV  Last administered on 

12/26/21at 04:15;  Start 12/26/21 at 04:15;  Stop 12/26/21 at 14:14;  Status DC


Clonidine HCl (Catapres) 0.2 mg 1X  ONCE PO  Last administered on 12/26/21at 

04:33;  Start 12/26/21 at 04:15;  Stop 12/26/21 at 04:16;  Status DC


Ondansetron HCl (Zofran) 4 mg STK-MED ONCE .ROUTE ;  Start 12/26/21 at 04:16;  

Stop 12/26/21 at 04:16;  Status DC


Nitroglycerin (Nitro-Bid Oint) 1 inch 1X  ONCE TP  Last administered on 

12/26/21at 04:15;  Start 12/26/21 at 04:15;  Stop 12/26/21 at 04:21;  Status DC


Ondansetron HCl (Zofran) 8 mg 1X  ONCE IVP  Last administered on 12/26/21at 

04:15;  Start 12/26/21 at 04:15;  Stop 12/26/21 at 04:21;  Status DC


Enoxaparin Sodium (Lovenox 100mg Syringe) 100 mg 1X  ONCE SQ  Last administered 

on 12/26/21at 08:05;  Start 12/26/21 at 06:30;  Stop 12/26/21 at 06:37;  Status 

DC


Nitroglycerin (Nitro-Bid Oint) 1 inch 1X  ONCE TP  Last administered on 

12/26/21at 06:30;  Start 12/26/21 at 06:30;  Stop 12/26/21 at 06:37;  Status DC


Aspirin (DoubleBeam Aspirin) 325 mg 1X  ONCE PO  Last administered on 12/26/21at 

06:45;  Start 12/26/21 at 06:45;  Stop 12/26/21 at 06:46;  Status DC


Ondansetron HCl (Zofran) 4 mg PRN Q4HRS  PRN IVP NAUSEA/VOMITING;  Start 

12/26/21 at 06:45;  Stop 12/27/21 at 06:44


Morphine Sulfate (Morphine 2mg Syringe) 2 mg PRN Q2HR  PRN IVP PAIN;  Start 

12/26/21 at 06:45;  Stop 12/27/21 at 06:44


Acetaminophen (Tylenol) 650 mg PRN Q4HRS  PRN PO FEVER > 100.3'F;  Start 

12/26/21 at 06:45;  Stop 12/27/21 at 06:44


Albuterol/ Ipratropium (Duoneb) 3 ml RTQID NEB ;  Start 12/26/21 at 08:00;  Stop

12/26/21 at 08:46;  Status DC


Enoxaparin Sodium (Lovenox 100mg Syringe) 100 mg BID  ONCE SQ ;  Start 12/26/21 

at 09:00;  Stop 12/26/21 at 09:01;  Status DC


Nitroglycerin (Nitro-Bid Oint) 1 inch TID  ONCE TP ;  Start 12/26/21 at 09:00;  

Stop 12/26/21 at 09:01;  Status DC


Aspirin (Monae Aspirin) 81 mg DAILY PO ;  Start 12/26/21 at 09:00


Lorazepam (Ativan Inj) 1 mg 1X  ONCE IVP  Last administered on 12/26/21at 08:05;

 Start 12/26/21 at 08:15;  Stop 12/26/21 at 08:16;  Status DC


Multivitamins/ Minerals 10 ml/ Thiamine HCl 100 mg/Folic Acid 1 mg/Sodium 

Chloride 1,011.2 ml  @ 100 mls/ hr DAILY IV ;  Start 12/27/21 at 09:00;  Stop 

1/1/22 at 08:59


Lorazepam (Ativan) 4 mg PRN Q1HR  PRN PO For CIWA 8-14;  Start 12/26/21 at 13:30


Lorazepam (Ativan Inj) 2 mg PRN Q1HR  PRN IV For CIWA 8-14;  Start 12/26/21 at 

13:30


Diphenhydramine HCl (Benadryl) 25 mg PRN Q15MIN  PRN IVP EPS symptoms 2'Haldol 

admin;  Start 12/26/21 at 13:30


Clonidine HCl (Catapres) 0.1 mg PRN Q1HR  PRN PO SBP>180 OR DBP>100, MR X 3 Last

administered on 12/26/21at 14:30;  Start 12/26/21 at 13:30


Allergies:  


Coded Allergies:  


     No Known Drug Allergies (Unverified , 9/19/21)


PSYCHOLOGICAL ROS:  No: Hallucinations


Eyes:  No: Loss of vision


HEENT:  No: Epistaxis


Respiratory:  No: Hemoptysis


Cardiovascular:  yes: Chest Pain


Gastrointestinal:  YES: Nausea, Vomiting; 


   No: Abdominal Pain


Genitourinary:  No: Henaturia


Neurological:  No: Seizures


Skin:  No: Rash


General:  Alert, Oriented X3


HEENT:  Atraumatic


Lungs:  Clear to auscultation


Heart:  Regular rate


Abdomen:  Soft


Extremities:  No edema


Neuro:  Normal speech


Psych/Mental Status:  Mood NL


VITALS





Vital Signs








  Date Time  Temp Pulse Resp B/P (MAP) Pulse Ox O2 Delivery O2 Flow Rate FiO2


 


12/26/21 14:30  117  163/109    


 


12/26/21 14:19 98.7  20  96 Room Air  








Labs





Laboratory Tests








Test


 12/26/21


04:10 12/26/21


05:50 12/26/21


07:56 12/26/21


11:20


 


White Blood Count


 7.2 x10^3/uL


(4.0-11.0) 


 


 





 


Red Blood Count


 5.42 x10^6/uL


(4.30-5.70) 


 


 





 


Hemoglobin


 16.3 g/dL


(13.0-17.5) 


 


 





 


Hematocrit


 49.5 %


(39.0-53.0) 


 


 





 


Mean Corpuscular Volume 91 fL ()    


 


Mean Corpuscular Hemoglobin 30 pg (25-35)    


 


Mean Corpuscular Hemoglobin


Concent 33 g/dL


(31-37) 


 


 





 


Red Cell Distribution Width


 14.6 %


(11.5-14.5) 


 


 





 


Platelet Count


 211 x10^3/uL


(140-400) 


 


 





 


Neutrophils (%) (Auto) 58 % (31-73)    


 


Lymphocytes (%) (Auto) 33 % (24-48)    


 


Monocytes (%) (Auto) 9 % (0-9)    


 


Eosinophils (%) (Auto) 0 % (0-3)    


 


Basophils (%) (Auto) 1 % (0-3)    


 


Neutrophils # (Auto)


 4.2 x10^3uL


(1.8-7.7) 


 


 





 


Lymphocytes # (Auto)


 2.4 x10^3/uL


(1.0-4.8) 


 


 





 


Monocytes # (Auto)


 0.6 x10^3/uL


(0.0-1.1) 


 


 





 


Eosinophils # (Auto)


 0.0 x10^3/uL


(0.0-0.7) 


 


 





 


Basophils # (Auto)


 0.0 x10^3/uL


(0.0-0.2) 


 


 





 


Prothrombin Time


 10.0 SEC


(9.4-11.4) 


 


 





 


Prothromb Time International


Ratio 1.0 (0.9-1.1) 


 


 


 





 


Activated Partial


Thromboplast Time 24 SEC (23-33) 


 


 


 





 


D-Dimer (Cierra)


 0.58 mg/L


(0.00-0.50) 


 


 





 


Sodium Level


 139 mmol/L


(136-145) 


 


 





 


Potassium Level


 4.4 mmol/L


(3.5-5.1) 


 


 





 


Chloride Level


 98 mmol/L


() 


 


 





 


Carbon Dioxide Level


 16 mmol/L


(21-32) 


 


 





 


Anion Gap 25 (6-14)    


 


Blood Urea Nitrogen


 24 mg/dL


(8-26) 


 


 





 


Creatinine


 1.3 mg/dL


(0.7-1.3) 


 


 





 


Estimated GFR


(Cockcroft-Gault) 71.9 


 


 


 





 


Glucose Level


 148 mg/dL


(70-99) 


 


 





 


Calcium Level


 8.6 mg/dL


(8.5-10.1) 


 


 





 


Magnesium Level


 2.3 mg/dL


(1.8-2.4) 


 


 





 


Total Bilirubin


 0.6 mg/dL


(0.2-1.0) 


 


 





 


Direct Bilirubin


 0.1 mg/dL


(0.0-0.2) 


 


 





 


Aspartate Amino Transf


(AST/SGOT) 53 U/L (15-37) 


 


 


 





 


Alanine Aminotransferase


(ALT/SGPT) 58 U/L (16-63) 


 


 


 





 


Alkaline Phosphatase


 79 U/L


() 


 


 





 


Creatine Kinase


 393 U/L


() 


 


 





 


Troponin I High Sensitivity 70 ng/L (4-75)   73 ng/L (4-75)  78 ng/L (4-75) 


 


NT-Pro-B-Type Natriuretic


Peptide 5 pg/mL


(0-124) 


 


 





 


Total Protein


 8.5 g/dL


(6.4-8.2) 


 


 





 


Albumin


 4.3 g/dL


(3.4-5.0) 


 


 





 


Amylase Level


 95 U/L


() 


 


 





 


Lipase


 109 U/L


() 


 


 





 


Thyroid Stimulating Hormone


(TSH) 1.451 uIU/mL


(0.358-3.740) 


 


 





 


Ethyl Alcohol Level


 72 mg/dL


(0-10) 


 


 





 


Urine Collection Type  Unknown   


 


Urine Color  Yellow   


 


Urine Clarity  Clear   


 


Urine pH  5.5   


 


Urine Specific Gravity  >=1.030   


 


Urine Protein


 


 100 mg/dl


(NEG-TRACE) 


 





 


Urine Glucose (UA)


 


 Neg mg/dL


(NEG) 


 





 


Urine Ketones (Stick)  80 mg/dL (NEG)   


 


Urine Blood  Mod (NEG)   


 


Urine Nitrite  Neg (NEG)   


 


Urine Bilirubin  Neg (NEG)   


 


Urine Urobilinogen Dipstick


 


 0.2 mg/dL (0.2


mg/dL) 


 





 


Urine Leukocyte Esterase  Neg (NEG)   


 


Urine RBC  3-5 /HPF (0-2)   


 


Urine WBC  1-4 /HPF (0-4)   


 


Urine Squamous Epithelial


Cells 


 Mod /LPF 


 


 





 


Urine Bacteria  0 /HPF (0-FEW)   


 


Urine Hyaline Casts  Few /HPF   


 


Urine Mucus  Slight /LPF   


 


Urine Opiates Screen  Neg (NEG)   


 


Urine Methadone Screen  Neg (NEG)   


 


Urine Barbiturates  Neg (NEG)   


 


Urine Phencyclidine Screen  Neg (NEG)   


 


Urine


Amphetamine/Methamphetamine 


 Neg (NEG) 


 


 





 


Urine Benzodiazepines Screen  Neg (NEG)   


 


Urine Cocaine Screen  Neg (NEG)   


 


Urine Cannabinoids Screen  Neg (NEG)   


 


Urine Ethyl Alcohol  Pos (NEG)   


 


Test


 12/26/21


12:25 


 


 





 


SARS-CoV-2 Antigen (Rapid)


 Negative


(NEGATIVE) 


 


 











Assessment/Plan


1.  Chest pain with atypical features, most probably GI etiology.  Slight 

troponin elevation possibly demand ischemia from uncontrolled hypertension.  EKG

showed sinus tachycardia without any acute changes.  Plan for 2D echo and 

ischemic evaluation as an outpatient.


2.  Accelerated hypertension secondary to noncompliance with medications.  Start

amlodipine for better control.


3.  Chronic alcohol abuse: Monitor for withdrawal


Thank you for your consultation











DELL BYRD MD           Dec 26, 2021 14:51

## 2021-12-26 NOTE — EKG
[facility] [address], [place] [address]

Test Date:    2021               Test Time:    06:16:07

Pat Name:     KATELYN ZAMORA           Department:   

Patient ID:   [id number]           Room:          

Gender:       M                        Technician:   LILLY

:          1975               Requested By: RUSTY KELLOGG

Order Number: [id number]            Reading MD:   Emmanuel Grey MD

                                 Measurements

Intervals                              Axis          

Rate:         114                      P:            56

WA:           156                      QRS:          24

QRSD:         90                       T:            51

QT:           338                                    

QTc:          469                                    

                           Interpretive Statements

SINUS TACHYCARDIA

NON-SPECIFIC ST/T CHANGES

Electronically Signed On 2021 9:25:29 CST by Emmanuel Grey MD

## 2021-12-27 VITALS — DIASTOLIC BLOOD PRESSURE: 107 MMHG | SYSTOLIC BLOOD PRESSURE: 160 MMHG

## 2021-12-27 LAB
ANION GAP SERPL CALC-SCNC: 14 MMOL/L (ref 6–14)
BASOPHILS # BLD AUTO: 0 X10^3/UL (ref 0–0.2)
BASOPHILS NFR BLD: 1 % (ref 0–3)
CA-I SERPL ISE-MCNC: 18 MG/DL (ref 8–26)
CALCIUM SERPL-MCNC: 8.8 MG/DL (ref 8.5–10.1)
CHLORIDE SERPL-SCNC: 99 MMOL/L (ref 98–107)
CO2 SERPL-SCNC: 24 MMOL/L (ref 21–32)
CREAT SERPL-MCNC: 1 MG/DL (ref 0.7–1.3)
EOSINOPHIL NFR BLD: 0.1 X10^3/UL (ref 0–0.7)
EOSINOPHIL NFR BLD: 1 % (ref 0–3)
ERYTHROCYTE [DISTWIDTH] IN BLOOD BY AUTOMATED COUNT: 14.2 % (ref 11.5–14.5)
GFR SERPLBLD BASED ON 1.73 SQ M-ARVRAT: 97.3 ML/MIN
GLUCOSE SERPL-MCNC: 140 MG/DL (ref 70–99)
HCT VFR BLD CALC: 45.7 % (ref 39–53)
HGB BLD-MCNC: 15.2 G/DL (ref 13–17.5)
LYMPHOCYTES # BLD: 1.6 X10^3/UL (ref 1–4.8)
LYMPHOCYTES NFR BLD AUTO: 32 % (ref 24–48)
MCH RBC QN AUTO: 30 PG (ref 25–35)
MCHC RBC AUTO-ENTMCNC: 33 G/DL (ref 31–37)
MCV RBC AUTO: 91 FL (ref 79–100)
MONO #: 0.5 X10^3/UL (ref 0–1.1)
MONOCYTES NFR BLD: 9 % (ref 0–9)
NEUT #: 2.8 X10^3UL (ref 1.8–7.7)
NEUTROPHILS NFR BLD AUTO: 57 % (ref 31–73)
PLATELET # BLD AUTO: 149 X10^3/UL (ref 140–400)
POTASSIUM SERPL-SCNC: 3.5 MMOL/L (ref 3.5–5.1)
RBC # BLD AUTO: 5.03 X10^6/UL (ref 4.3–5.7)
SODIUM SERPL-SCNC: 137 MMOL/L (ref 136–145)
WBC # BLD AUTO: 5 X10^3/UL (ref 4–11)

## 2021-12-27 NOTE — HP
DATE OF SERVICE: 2021

ADMIT DATE: 2021

ATTENDING PHYSICIAN:  Dr. Boswell.



CHIEF COMPLAINT:  Chest pain.



HISTORY OF PRESENT ILLNESS:  The patient is a 46-year-old gentleman admitted to 

the ED with intermittent chest pain, sharp in nature without radiation.  He has 

had some nausea and vomiting.  He drinks alcohol to excess.  His choice is 

vodka.  He also smokes.  In the ED, the workup showed no obvious failure.  There

were some nonspecific EKG changes.  He did have slight elevation of the super 

sensitive cardiac enzymes.  He was seen in consultation and evaluated by 

Cardiology.  They recommended admission to the floor, telemetry monitoring, 

treatment of his blood pressure and stomach and outpatient workup with a stress 

test.



PAST MEDICAL HISTORY:  Significant for hypertension, noncompliance.  He also has

underlying anxiety with depression by my account.



SOCIAL HISTORY:  He is an alcoholic.  He drinks to excess on a daily basis.  He 

smokes a pack of cigarettes daily.



FAMILY HISTORY:  Father  at age 71 of complication of lung and liver 

disease.  Mom is still alive at age 74 in fairly good health.



REVIEW OF SYSTEMS:  He did not have any hematemesis.  He was concerned about the

chest pain.  I reassured him that his symptoms are related to GI issues.  All 

other systems reviewed and turned out to be negative.  He is not on any current 

medications.  He has been prescribed antihypertensive remotely in the past.  He 

does not have a local physician.  He has not been compliant.



PHYSICAL EXAMINATION:

GENERAL:  When I saw him, this is a pleasant, middle-aged gentleman.

VITAL SIGNS:  Initial vital signs showed blood pressure 160/107, pulse is 96 and

regular.  He was afebrile, oxygen saturation 94% on room air.

HEENT:  Head is without trauma.  Pupils are reactive.  Sclerae nonicteric.  

Oropharynx is clear.

NECK:  Supple, no bruits identified.

LUNGS:  Clear.

CARDIOVASCULAR:  Regular heart tones.

ABDOMEN:  Soft.

EXTREMITIES:  Without edema.

NEUROLOGIC FINDINGS:  Focally intact.

SKIN:  Warm and dry.



LABORATORY STUDIES:  The first troponin was 73, second one 78.  Electrolytes 

within normal range.  Hemoglobin 15.2 grams, white count 5000.  Cholesterol 

level was 339 with triglycerides of 519.



ASSESSMENT:

1.  A 46-year-old gentleman with atypical chest pain, most likely GI in nature.

2.  Alcoholic gastritis.

3.  Hyperlipidemia.

4.  Labile hypertension.

5.  Noncompliance of meds.

6.  Underlying depression with anxiety.



PLAN:

1.  Admit to the inpatient unit.

2.  Beta blockade and amlodipine started.

3.  Aspirin daily.

4.  I shall start him on a statin drug.

5.  Strong encouragement to quit alcohol and tobacco use, whether or not he will

remain to be seen.  Formal Cardiology consultation.  They recommended stress 

testing as an outpatient.







REMIGIO/SEBLE

DR: Jasmin   DD: 2021 08:55

DT: 2021 09:16   TID: [id number]

## 2021-12-27 NOTE — NUR
NURSING NOTE



MESSAGE LEFT FOR THIS NURSE TO RETURN CALL TO PT MOTHER. 



THIS RN SPOKE WITH PT THIS AM DURING DISCHARGE EDUCATION ABOUT HIPPA AND SPEAKING WITH IS 
MOTHER AND PT STATED HE WOULD SPEAK TO HIS MOTHER AFTER HE LEAVES. 



THIS RN DID NOT RETURN CALL. 



MATA BAKER.

## 2021-12-27 NOTE — DS
DATE OF DISCHARGE: 12/27/2021

ATTENDING PHYSICIAN:  Dr. Boswell.



FINAL DISCHARGE DIAGNOSES:

1.  Chest pain, noncardiac.

2.  Stress demand ischemia with slight elevation of cardiac enzyme.

3.  Labile hypertension with noncompliance.

4.  Chronic alcoholism.

5.  Tobacco abuse.

6.  Hyperlipidemia.



HISTORY AND PHYSICAL:  The patient is a 46-year-old gentleman with multiple 

medical issues.  He does not have a local doctor.  He is hypertensive.  He 

drinks alcohol on daily basis and smokes cigarettes.  He came in with chest 

pain, which is noncardiac in nature.  There was a slight elevation of cardiac 

enzymes.  Cardiology consultation was obtained.  He is not in any lita failure 

or impending ischemia.  They recommended outpatient workup with a stress test 

and echocardiogram, whether or not that can be accomplished remains to be seen. 

He does not have a primary care physician.  He was admitted and started on blood

pressure meds, aspirin and stomach meds along with cholesterol medication.



PHYSICAL EXAMINATION:  Please see the dictated note.



PERTINENT LABORATORY AND X-RAY STUDIES:  On the database, the enzymes were at 

78, slightly above the normal range.  Electrolytes within normal range.  CBC and

white count were normal.  Cholesterol was markedly elevated at 339 with a 

triglyceride of 519.



COURSE IN THE HOSPITAL:  He was admitted.  We started him on blood pressure 

meds.  He calmed down.  Chest pain subsided. On the second hospital day, 

Cardiology followup recommended outpatient stress test and echocardiogram.  This

will be set up as an outpatient.  He is discharged home with scripts of atenolol

100 mg p.o. daily, I gave him enough for 30 days and 2 refills.  He is 

instructed to find a primary care doctor.  I also recommended Lipitor 40 mg 

daily along with amlodipine 10 mg daily and aspirin 1 daily.  Also a script for 

Nexium 40 mg daily.  Strong encouragement to avoid further alcohol and tobacco 

use, whether or not he will apply his resources to his medication or to his 

alcohol and tobacco remains to be seen.  In any event, he was given explicit 

instructions and followup care.







ALEJANDRO GOMEZ: Jasmin   DD: 12/27/2021 09:01

DT: 12/27/2021 09:37   TID: [id number]

## 2021-12-27 NOTE — PDOC
CARDIO Progress Notes


Date & Time


Date of Service


DATE: 12/27/21 


TIME: 08:14


Time of Evaluation


08:14





Subjective


Notes


CP resolved





Vitals


Vitals





Vital Signs








  Date Time  Temp Pulse Resp B/P (MAP) Pulse Ox O2 Delivery O2 Flow Rate FiO2


 


12/27/21 05:24  96  160/107    


 


12/27/21 04:45 98.0  20  94 Room Air  








Weight


Weight [ ]





Input and Output


I.O.











Intake and Output 


 


 12/27/21





 07:00


 


Intake Total 1360 ml


 


Balance 1360 ml


 


 


 


Intake Oral 1360 ml


 


# Voids 3











Laboratory


Labs





Laboratory Tests








Test


 12/26/21


04:10 12/26/21


05:50 12/26/21


07:52 12/26/21


07:56


 


White Blood Count


 7.2 x10^3/uL


(4.0-11.0) 


 


 





 


Red Blood Count


 5.42 x10^6/uL


(4.30-5.70) 


 


 





 


Hemoglobin


 16.3 g/dL


(13.0-17.5) 


 


 





 


Hematocrit


 49.5 %


(39.0-53.0) 


 


 





 


Mean Corpuscular Volume 91 fL ()    


 


Mean Corpuscular Hemoglobin 30 pg (25-35)    


 


Mean Corpuscular Hemoglobin


Concent 33 g/dL


(31-37) 


 


 





 


Red Cell Distribution Width


 14.6 %


(11.5-14.5) 


 


 





 


Platelet Count


 211 x10^3/uL


(140-400) 


 


 





 


Neutrophils (%) (Auto) 58 % (31-73)    


 


Lymphocytes (%) (Auto) 33 % (24-48)    


 


Monocytes (%) (Auto) 9 % (0-9)    


 


Eosinophils (%) (Auto) 0 % (0-3)    


 


Basophils (%) (Auto) 1 % (0-3)    


 


Neutrophils # (Auto)


 4.2 x10^3uL


(1.8-7.7) 


 


 





 


Lymphocytes # (Auto)


 2.4 x10^3/uL


(1.0-4.8) 


 


 





 


Monocytes # (Auto)


 0.6 x10^3/uL


(0.0-1.1) 


 


 





 


Eosinophils # (Auto)


 0.0 x10^3/uL


(0.0-0.7) 


 


 





 


Basophils # (Auto)


 0.0 x10^3/uL


(0.0-0.2) 


 


 





 


Prothrombin Time


 10.0 SEC


(9.4-11.4) 


 


 





 


Prothromb Time International


Ratio 1.0 (0.9-1.1) 


 


 


 





 


Activated Partial


Thromboplast Time 24 SEC (23-33) 


 


 


 





 


D-Dimer (Cierra)


 0.58 mg/L


(0.00-0.50) 


 


 





 


Sodium Level


 139 mmol/L


(136-145) 


 


 





 


Potassium Level


 4.4 mmol/L


(3.5-5.1) 


 


 





 


Chloride Level


 98 mmol/L


() 


 


 





 


Carbon Dioxide Level


 16 mmol/L


(21-32) 


 


 





 


Anion Gap 25 (6-14)    


 


Blood Urea Nitrogen


 24 mg/dL


(8-26) 


 


 





 


Creatinine


 1.3 mg/dL


(0.7-1.3) 


 


 





 


Estimated GFR


(Cockcroft-Gault) 71.9 


 


 


 





 


Glucose Level


 148 mg/dL


(70-99) 


 


 





 


Calcium Level


 8.6 mg/dL


(8.5-10.1) 


 


 





 


Magnesium Level


 2.3 mg/dL


(1.8-2.4) 


 


 





 


Total Bilirubin


 0.6 mg/dL


(0.2-1.0) 


 


 





 


Direct Bilirubin


 0.1 mg/dL


(0.0-0.2) 


 


 





 


Aspartate Amino Transf


(AST/SGOT) 53 U/L (15-37) 


 


 


 





 


Alanine Aminotransferase


(ALT/SGPT) 58 U/L (16-63) 


 


 


 





 


Alkaline Phosphatase


 79 U/L


() 


 


 





 


Creatine Kinase


 393 U/L


() 


 


 





 


Troponin I High Sensitivity 70 ng/L (4-75)    73 ng/L (4-75) 


 


NT-Pro-B-Type Natriuretic


Peptide 5 pg/mL


(0-124) 


 


 





 


Total Protein


 8.5 g/dL


(6.4-8.2) 


 


 





 


Albumin


 4.3 g/dL


(3.4-5.0) 


 


 





 


Amylase Level


 95 U/L


() 


 


 





 


Lipase


 109 U/L


() 


 


 





 


Thyroid Stimulating Hormone


(TSH) 1.451 uIU/mL


(0.358-3.740) 


 


 





 


Ethyl Alcohol Level


 72 mg/dL


(0-10) 


 


 





 


Urine Collection Type  Unknown   


 


Urine Color  Yellow   


 


Urine Clarity  Clear   


 


Urine pH  5.5   


 


Urine Specific Gravity  >=1.030   


 


Urine Protein


 


 100 mg/dl


(NEG-TRACE) 


 





 


Urine Glucose (UA)


 


 Neg mg/dL


(NEG) 


 





 


Urine Ketones (Stick)  80 mg/dL (NEG)   


 


Urine Blood  Mod (NEG)   


 


Urine Nitrite  Neg (NEG)   


 


Urine Bilirubin  Neg (NEG)   


 


Urine Urobilinogen Dipstick


 


 0.2 mg/dL (0.2


mg/dL) 


 





 


Urine Leukocyte Esterase  Neg (NEG)   


 


Urine RBC  3-5 /HPF (0-2)   


 


Urine WBC  1-4 /HPF (0-4)   


 


Urine Squamous Epithelial


Cells 


 Mod /LPF 


 


 





 


Urine Bacteria  0 /HPF (0-FEW)   


 


Urine Hyaline Casts  Few /HPF   


 


Urine Mucus  Slight /LPF   


 


Urine Opiates Screen  Neg (NEG)   


 


Urine Methadone Screen  Neg (NEG)   


 


Urine Barbiturates  Neg (NEG)   


 


Urine Phencyclidine Screen  Neg (NEG)   


 


Urine


Amphetamine/Methamphetamine 


 Neg (NEG) 


 


 





 


Urine Benzodiazepines Screen  Neg (NEG)   


 


Urine Cocaine Screen  Neg (NEG)   


 


Urine Cannabinoids Screen  Neg (NEG)   


 


Urine Ethyl Alcohol  Pos (NEG)   


 


Triglycerides Level


 


 


 519 mg/dL


(0-150) 





 


Cholesterol Level


 


 


 339 mg/dL


(0-200) 





 


LDL Cholesterol, Calculated    mg/dL (0-100)  


 


VLDL Cholesterol, Calculated


 


 


 103 mg/dL


(0-40) 





 


Non-HDL Cholesterol Calculated


 


 


 292 mg/dL


(0-129) 





 


HDL Cholesterol


 


 


 47 mg/dL


(40-60) 





 


Cholesterol/HDL Ratio   7.0  


 


Test


 12/26/21


11:20 12/26/21


12:25 12/27/21


06:11 





 


Troponin I High Sensitivity 78 ng/L (4-75)    


 


Coronavirus (COVID-19)(PCR)


 


 Not detected


(NOT DETECTD) 


 





 


SARS-CoV-2 Antigen (Rapid)


 


 Negative


(NEGATIVE) 


 





 


White Blood Count


 


 


 5.0 x10^3/uL


(4.0-11.0) 





 


Red Blood Count


 


 


 5.03 x10^6/uL


(4.30-5.70) 





 


Hemoglobin


 


 


 15.2 g/dL


(13.0-17.5) 





 


Hematocrit


 


 


 45.7 %


(39.0-53.0) 





 


Mean Corpuscular Volume   91 fL ()  


 


Mean Corpuscular Hemoglobin   30 pg (25-35)  


 


Mean Corpuscular Hemoglobin


Concent 


 


 33 g/dL


(31-37) 





 


Red Cell Distribution Width


 


 


 14.2 %


(11.5-14.5) 





 


Platelet Count


 


 


 149 x10^3/uL


(140-400) 





 


Neutrophils (%) (Auto)   57 % (31-73)  


 


Lymphocytes (%) (Auto)   32 % (24-48)  


 


Monocytes (%) (Auto)   9 % (0-9)  


 


Eosinophils (%) (Auto)   1 % (0-3)  


 


Basophils (%) (Auto)   1 % (0-3)  


 


Neutrophils # (Auto)


 


 


 2.8 x10^3uL


(1.8-7.7) 





 


Lymphocytes # (Auto)


 


 


 1.6 x10^3/uL


(1.0-4.8) 





 


Monocytes # (Auto)


 


 


 0.5 x10^3/uL


(0.0-1.1) 





 


Eosinophils # (Auto)


 


 


 0.1 x10^3/uL


(0.0-0.7) 





 


Basophils # (Auto)


 


 


 0.0 x10^3/uL


(0.0-0.2) 





 


Sodium Level


 


 


 137 mmol/L


(136-145) 





 


Potassium Level


 


 


 3.5 mmol/L


(3.5-5.1) 





 


Chloride Level


 


 


 99 mmol/L


() 





 


Carbon Dioxide Level


 


 


 24 mmol/L


(21-32) 





 


Anion Gap   14 (6-14)  


 


Blood Urea Nitrogen


 


 


 18 mg/dL


(8-26) 





 


Creatinine


 


 


 1.0 mg/dL


(0.7-1.3) 





 


Estimated GFR


(Cockcroft-Gault) 


 


 97.3 


 





 


Glucose Level


 


 


 140 mg/dL


(70-99) 





 


Calcium Level


 


 


 8.8 mg/dL


(8.5-10.1) 














Physical Exams


HEENT:  Neck Supple W Full Motion


Chest:  Symmetric


Lungs:  Clear to Auscultation


Heart:  RRR


Abdomen:  Soft N/T


Extremities:  No Edema


Neurology:  alert, oriented, follow commands





Assessment


Assessment


1.  Chest pain with atypical features, most probably GI etiology.  Slight 

troponin elevation possibly demand ischemia from uncontrolled hypertension.  EKG

showed sinus tachycardia without any acute changes.  Will plan for outpatient 

ischemic evaluation. Will arranged through [facility] self-pay clinic as 

patient does not have insurance coverage. 


2.  Accelerated hypertension secondary to noncompliance with medications; 

improved, but remains elevated. Add metoprolol


3.  Hyperlipidemia; add statin 


3.  Chronic alcohol abuse: Monitor for withdrawal











DOMINIQUE STEPHENSON           Dec 27, 2021 08:17

## 2021-12-27 NOTE — NUR
NURSING NOTE DISCHARGE



PT DISCHARGED HOME VIA AMBULATION PICKED UP AT THE [facility] BY [facility] TO RIDE TO HIS CAR [place]. 



PT GIVEN EXTENSIVE LIFESTYLE EDUCATION ABOUT RISKS OF ALCOHOL AND SMOKING. PT GIVEN 
EXTENSIVE EDUCATION ABOUT HYPERTENSION AND CHEST PAIN. 



PT GIVEN WRITTEN PRESCRIPTIONS FOR AMLODIPINE, ATENOLOL, ASPIRIN, ATORVASTATIN, AND NEXIUM. 
EDUCATION PROVIDED AND WRITTEN OUT ON DISCHARGE INSTRUCTION PAPERWORK.



PT VERBALIZED UNDERSTANDING.



MATA BAKER.

## 2022-02-13 ENCOUNTER — HOSPITAL ENCOUNTER (INPATIENT)
Dept: HOSPITAL 63 - ER | Age: 47
LOS: 2 days | Discharge: HOME | DRG: 281 | End: 2022-02-15
Attending: INTERNAL MEDICINE | Admitting: INTERNAL MEDICINE
Payer: SELF-PAY

## 2022-02-13 VITALS — DIASTOLIC BLOOD PRESSURE: 96 MMHG | SYSTOLIC BLOOD PRESSURE: 159 MMHG

## 2022-02-13 VITALS — SYSTOLIC BLOOD PRESSURE: 152 MMHG | DIASTOLIC BLOOD PRESSURE: 107 MMHG

## 2022-02-13 VITALS — BODY MASS INDEX: 29.51 KG/M2 | HEIGHT: 73 IN | WEIGHT: 222.67 LBS

## 2022-02-13 VITALS — SYSTOLIC BLOOD PRESSURE: 169 MMHG | DIASTOLIC BLOOD PRESSURE: 114 MMHG

## 2022-02-13 DIAGNOSIS — F10.229: ICD-10-CM

## 2022-02-13 DIAGNOSIS — R79.89: ICD-10-CM

## 2022-02-13 DIAGNOSIS — Z82.49: ICD-10-CM

## 2022-02-13 DIAGNOSIS — R74.01: ICD-10-CM

## 2022-02-13 DIAGNOSIS — M19.90: ICD-10-CM

## 2022-02-13 DIAGNOSIS — F17.210: ICD-10-CM

## 2022-02-13 DIAGNOSIS — E78.5: ICD-10-CM

## 2022-02-13 DIAGNOSIS — I21.A1: Primary | ICD-10-CM

## 2022-02-13 DIAGNOSIS — Z91.14: ICD-10-CM

## 2022-02-13 DIAGNOSIS — Z83.3: ICD-10-CM

## 2022-02-13 DIAGNOSIS — F10.239: ICD-10-CM

## 2022-02-13 DIAGNOSIS — I10: ICD-10-CM

## 2022-02-13 DIAGNOSIS — K21.9: ICD-10-CM

## 2022-02-13 DIAGNOSIS — Z20.822: ICD-10-CM

## 2022-02-13 DIAGNOSIS — F41.9: ICD-10-CM

## 2022-02-13 LAB
ALBUMIN SERPL-MCNC: 4.8 G/DL (ref 3.4–5)
ALP SERPL-CCNC: 87 U/L (ref 46–116)
ALT SERPL-CCNC: 154 U/L (ref 16–63)
AMPHETAMINE/METHAMPHETAMINE: (no result)
ANION GAP SERPL CALC-SCNC: 30 MMOL/L (ref 6–14)
APTT PPP: YELLOW S
AST SERPL-CCNC: 92 U/L (ref 15–37)
BACTERIA #/AREA URNS HPF: 0 /HPF
BARBITURATES UR-MCNC: (no result) UG/ML
BASOPHILS # BLD AUTO: 0.1 X10^3/UL (ref 0–0.2)
BASOPHILS NFR BLD: 1 % (ref 0–3)
BENZODIAZ UR-MCNC: (no result) UG/L
BILIRUB DIRECT SERPL-MCNC: 0.2 MG/DL (ref 0–0.2)
BILIRUB SERPL-MCNC: 1.1 MG/DL (ref 0.2–1)
BILIRUB UR QL STRIP: (no result)
CA-I SERPL ISE-MCNC: 17 MG/DL (ref 8–26)
CALCIUM SERPL-MCNC: 9.6 MG/DL (ref 8.5–10.1)
CANNABINOIDS UR-MCNC: (no result) UG/L
CHLORIDE SERPL-SCNC: 98 MMOL/L (ref 98–107)
CO2 SERPL-SCNC: 16 MMOL/L (ref 21–32)
COCAINE UR-MCNC: (no result) NG/ML
CREAT SERPL-MCNC: 1.2 MG/DL (ref 0.7–1.3)
EOSINOPHIL NFR BLD: 0 % (ref 0–3)
EOSINOPHIL NFR BLD: 0 X10^3/UL (ref 0–0.7)
ERYTHROCYTE [DISTWIDTH] IN BLOOD BY AUTOMATED COUNT: 15.9 % (ref 11.5–14.5)
FIBRINOGEN PPP-MCNC: CLEAR MG/DL
GFR SERPLBLD BASED ON 1.73 SQ M-ARVRAT: 78.9 ML/MIN
GLUCOSE SERPL-MCNC: 97 MG/DL (ref 70–99)
GLUCOSE UR STRIP-MCNC: (no result) MG/DL
HCT VFR BLD CALC: 47.9 % (ref 39–53)
HGB BLD-MCNC: 16.1 G/DL (ref 13–17.5)
INFLUENZA A PATIENT: NEGATIVE
INFLUENZA B PATIENT: NEGATIVE
LYMPHOCYTES # BLD: 2.2 X10^3/UL (ref 1–4.8)
LYMPHOCYTES NFR BLD AUTO: 35 % (ref 24–48)
MAGNESIUM SERPL-MCNC: 1.8 MG/DL (ref 1.8–2.4)
MCH RBC QN AUTO: 30 PG (ref 25–35)
MCHC RBC AUTO-ENTMCNC: 34 G/DL (ref 31–37)
MCV RBC AUTO: 89 FL (ref 79–100)
METHADONE SERPL-MCNC: (no result) NG/ML
MONO #: 0.5 X10^3/UL (ref 0–1.1)
MONOCYTES NFR BLD: 7 % (ref 0–9)
NEUT #: 3.6 X10^3UL (ref 1.8–7.7)
NEUTROPHILS NFR BLD AUTO: 57 % (ref 31–73)
NITRITE UR QL STRIP: (no result)
OPIATES UR-MCNC: (no result) NG/ML
PCP SERPL-MCNC: (no result) MG/DL
PLATELET # BLD AUTO: 250 X10^3/UL (ref 140–400)
POTASSIUM SERPL-SCNC: 4.4 MMOL/L (ref 3.5–5.1)
PROT SERPL-MCNC: 8.6 G/DL (ref 6.4–8.2)
RBC # BLD AUTO: 5.38 X10^6/UL (ref 4.3–5.7)
RBC #/AREA URNS HPF: (no result) /HPF (ref 0–2)
SODIUM SERPL-SCNC: 144 MMOL/L (ref 136–145)
SP GR UR STRIP: >=1.03
SQUAMOUS #/AREA URNS LPF: (no result) /LPF
UROBILINOGEN UR-MCNC: 0.2 MG/DL
WBC # BLD AUTO: 6.4 X10^3/UL (ref 4–11)
WBC #/AREA URNS HPF: (no result) /HPF (ref 0–4)

## 2022-02-13 PROCEDURE — 82947 ASSAY GLUCOSE BLOOD QUANT: CPT

## 2022-02-13 PROCEDURE — 70450 CT HEAD/BRAIN W/O DYE: CPT

## 2022-02-13 PROCEDURE — 82140 ASSAY OF AMMONIA: CPT

## 2022-02-13 PROCEDURE — 96375 TX/PRO/DX INJ NEW DRUG ADDON: CPT

## 2022-02-13 PROCEDURE — 71045 X-RAY EXAM CHEST 1 VIEW: CPT

## 2022-02-13 PROCEDURE — 87428 SARSCOV & INF VIR A&B AG IA: CPT

## 2022-02-13 PROCEDURE — U0003 INFECTIOUS AGENT DETECTION BY NUCLEIC ACID (DNA OR RNA); SEVERE ACUTE RESPIRATORY SYNDROME CORONAVIRUS 2 (SARS-COV-2) (CORONAVIRUS DISEASE [COVID-19]), AMPLIFIED PROBE TECHNIQUE, MAKING USE OF HIGH THROUGHPUT TECHNOLOGIES AS DESCRIBED BY CMS-2020-01-R: HCPCS

## 2022-02-13 PROCEDURE — 80076 HEPATIC FUNCTION PANEL: CPT

## 2022-02-13 PROCEDURE — 80053 COMPREHEN METABOLIC PANEL: CPT

## 2022-02-13 PROCEDURE — 83735 ASSAY OF MAGNESIUM: CPT

## 2022-02-13 PROCEDURE — 85025 COMPLETE CBC W/AUTO DIFF WBC: CPT

## 2022-02-13 PROCEDURE — 80307 DRUG TEST PRSMV CHEM ANLYZR: CPT

## 2022-02-13 PROCEDURE — 96365 THER/PROPH/DIAG IV INF INIT: CPT

## 2022-02-13 PROCEDURE — 85610 PROTHROMBIN TIME: CPT

## 2022-02-13 PROCEDURE — 93306 TTE W/DOPPLER COMPLETE: CPT

## 2022-02-13 PROCEDURE — 80048 BASIC METABOLIC PNL TOTAL CA: CPT

## 2022-02-13 PROCEDURE — 81001 URINALYSIS AUTO W/SCOPE: CPT

## 2022-02-13 PROCEDURE — 93005 ELECTROCARDIOGRAM TRACING: CPT

## 2022-02-13 PROCEDURE — 84484 ASSAY OF TROPONIN QUANT: CPT

## 2022-02-13 PROCEDURE — 36415 COLL VENOUS BLD VENIPUNCTURE: CPT

## 2022-02-13 NOTE — PHYS DOC
Past History


Past Medical History:  Alcoholism, Anxiety, Arthritis, GERD, Hypertension


 (ROCK JACKSON MD)


Past Surgical History:  No Surgical History


 (ROCK JACKSON MD)


Smoking:  Cigarettes


Alcohol Use:  Heavy


 (ROCK JACKSON MD)





Adult General


Chief Complaint


Chief Complaint:  ALCOHOL INTOXICATION





HPI


HPI


Patient is a 46-year-old male who presents with nausea, and vomiting for the 

last half a day or so.  Patient states he is an alcoholic and drinks 

approximately 6-8 hard liquor drinks daily.  States sometimes a little less and 

sometimes a little more.  States that his last drink was about 12 PM to 1 PM 

yesterday afternoon.  States he has had this problem for a while and has been in

rehabs 3 times and has quit temporarily but always comes back.  States he also 

recently started smoking cigarettes again.  States he has had about 6 or 7 

episodes of nonbloody nonbilious emesis and has not eaten anything really in 2 

days or had anything to drink other than some alcohol and a little bit of water.

 States he is still making urine and stool normally for him with no blood in 

either.  Denies recent travel, traumas, illnesses, fevers, chest pain, shortness

of breath, abdominal pain, dysuria, hematuria or diarrhea.  Denies any other 

drug use.


 (ROCK JACKSON MD)





Review of Systems


Review of Systems


Review of systems otherwise unremarkable except noted in HPI


 (ROCK JACKSON MD)





Allergies


Allergies





Allergies








Coded Allergies Type Severity Reaction Last Updated Verified


 


  No Known Drug Allergies    9/19/21 No








 (ROCK JACKSON MD)





Physical Exam


Physical Exam





Constitutional: Well developed, well nourished, no acute distress, non-toxic 

appearance. []


HENT: Normocephalic, atraumatic, bilateral external ears normal, oropharynx 

moist, no oral exudates, nose normal. []


Eyes: PERRLA, EOMI, conjunctiva normal, no discharge. [] 


Neck: Normal range of motion, no tenderness, supple, no stridor. [] 


Cardiovascular:Heart rate regular rhythm, no murmur []


Lungs & Thorax:  Bilateral breath sounds clear to auscultation []


Abdomen: soft, no tenderness, no masses, no pulsatile masses. [] 


Skin: Warm, dry, no erythema, no rash. [] 


Extremities: No tenderness,  ROM intact, no edema. [] 


Neurologic: Alert and oriented X 3, normal motor function, normal sensory 

function, able to sit, stand and walk without issue no focal deficits noted. []


Psychologic: Affect normal, judgement normal, mood normal. []


 (ROCK JACKSON MD)





Current Patient Data


Vital Signs





Vital Signs








  Date Time  Temp Pulse Resp B/P (MAP) Pulse Ox O2 Delivery O2 Flow Rate FiO2


 


2/13/22 03:57 98.1 130 18 159/117 (131) 94 Room Air  








Vital Signs








  Date Time  Temp Pulse Resp B/P (MAP) Pulse Ox O2 Delivery O2 Flow Rate FiO2


 


2/13/22 03:57 98.1 130 18 159/117 (131) 94 Room Air  








Lab Results





Laboratory Tests








Test


 2/13/22


04:20


 


White Blood Count 6.4 x10^3/uL 


 


Red Blood Count 5.38 x10^6/uL 


 


Hemoglobin 16.1 g/dL 


 


Hematocrit 47.9 % 


 


Mean Corpuscular Volume 89 fL 


 


Mean Corpuscular Hemoglobin 30 pg 


 


Mean Corpuscular Hemoglobin


Concent 34 g/dL 





 


Red Cell Distribution Width 15.9 % 


 


Platelet Count 250 x10^3/uL 


 


Neutrophils (%) (Auto) 57 % 


 


Lymphocytes (%) (Auto) 35 % 


 


Monocytes (%) (Auto) 7 % 


 


Eosinophils (%) (Auto) 0 % 


 


Basophils (%) (Auto) 1 % 


 


Neutrophils # (Auto) 3.6 x10^3uL 


 


Lymphocytes # (Auto) 2.2 x10^3/uL 


 


Monocytes # (Auto) 0.5 x10^3/uL 


 


Eosinophils # (Auto) 0.0 x10^3/uL 


 


Basophils # (Auto) 0.1 x10^3/uL 


 


Sodium Level 144 mmol/L 


 


Potassium Level 4.4 mmol/L 


 


Chloride Level 98 mmol/L 


 


Carbon Dioxide Level 16 mmol/L 


 


Anion Gap 30 


 


Blood Urea Nitrogen 17 mg/dL 


 


Creatinine 1.2 mg/dL 


 


Estimated GFR


(Cockcroft-Gault) 78.9 





 


Glucose Level 97 mg/dL 


 


Calcium Level 9.6 mg/dL 


 


Magnesium Level 1.8 mg/dL 


 


Troponin I High Sensitivity 229 ng/L 








Current Medications








 Medications


  (Trade)  Dose


 Ordered  Sig/Michael


 Route


 PRN Reason  Start Time


 Stop Time Status Last Admin


Dose Admin


 


 Lactated Ringer's  1,000 ml @ 


 1,000 mls/hr  1X  ONCE


 IV


   2/13/22 04:30


 2/13/22 05:29 DC 2/13/22 04:30





 


 Thiamine HCl 100


 mg/Sodium Chloride  51 ml @ 


 102 mls/hr  1X  ONCE


 IV


   2/13/22 04:30


 2/13/22 04:59 DC 2/13/22 04:31





 


 Folic Acid


  (Folic Acid)  1 mg  1X  ONCE


 PO


   2/13/22 04:30


 2/13/22 04:31 DC 2/13/22 04:31





 


 Cyanocobalamin


  (Vitamin B-12)  1,000 mcg  1X


 PO


   2/13/22 04:30


 2/13/22 04:14 DC  





 


 Metoclopramide HCl


  (Reglan Vial)  10 mg  1X  ONCE


 IVP


   2/13/22 04:30


 2/13/22 04:31 DC 2/13/22 04:31





 


 Cyanocobalamin


  (Vitamin B-12)  1,000 mcg  1X  ONCE


 PO


   2/13/22 04:30


 2/13/22 04:31 DC 2/13/22 04:42





 


 Sodium Chloride  50 ml @ As


 Directed  STK-MED ONCE


 .ROUTE


   2/13/22 04:28


 2/13/22 04:28 DC  





 


 Thiamine HCl


  (Thiamine Vial)  200 mg  STK-MED ONCE


 IV


   2/13/22 04:28


 2/13/22 04:28 DC  





 


 Lorazepam


  (Ativan Inj)  4 mg  1X  ONCE


 IVP


   2/13/22 04:45


 2/13/22 04:46 DC 2/13/22 04:42





 


 Lactated Ringer's  1,000 ml @ 


 1,000 mls/hr  1X  ONCE


 IV


   2/13/22 06:00


 2/13/22 06:59  2/13/22 06:01





 


 Ceftriaxone


 Sodium 1 gm/


 Sodium Chloride  50 ml @ 


 100 mls/hr  1X  ONCE


 IV


   2/13/22 06:00


 2/13/22 06:29  2/13/22 06:01





 


 Sodium Chloride  50 ml @ As


 Directed  STK-MED ONCE


 .ROUTE


   2/13/22 05:57


 2/13/22 05:58 DC  





 


 Ceftriaxone Sodium


  (Rocephin)  1 gm  STK-MED ONCE


 .ROUTE


   2/13/22 05:57


 2/13/22 05:58 DC  











 (JAMES MARCUS DO)


EKG


EKG


[]


 (ROCK JACKSON MD)


EKG


EKG ordered and interpreted by myself at 0619 hrs. as sinus rhythm at 122 bpm, 

unremarkable intervals, no axis deviation, nonspecific T wave abnormalities in 

lead I with T wave inversion noted in lead 4 through V6, no STEMI


 (JAMES MARCUS DO)


Radiology/Procedures


Radiology/Procedures


[]


 (ROCK JACKSON MD)





Heart Score


C/O Chest Pain:  No


Risk Factors:


Risk Factors:  DM, Current or recent (<one month) smoker, HTN, HLP, family 

history of CAD, obesity.


Risk Scores:


Risk Factors:  DM, Current or recent (<one month) smoker, HTN, HLP, family 

history of CAD, obesity.


 (ROCK JACKSON MD)


C/O Chest Pain:  No


HEART Score for Chest Pain:  








HEART Score for Chest Pain Response (Comments) Value


 


History Moderately Suspicious 1


 


ECG Nonspecific Repolarizatio 1


 


Age >45 - < 65 1


 


Risk Factors                            1 or 2 Risk Factors 1


 


Troponin >1-<3x Normal Limit 1


 


Total  5








 (JAMES MARCUS DO)


Course & Med Decision Making


Course & Med Decision Making


Patient is a 46-year-old male who presents with nausea and vomiting secondary to

 either alcoholism or withdrawal


Vital signs notable for tachycardia and hypertension.  Physical exam noted 

above.  Placed on the monitor with IV access established and IV fluid given.  

Replaced B vitamins and folate.  Given nausea medicine.  Given benzodiazepines 

for alcohol withdrawal.


Laboratory analysis pending imaging pending patient care handed off to day team 

for continued evaluation, treatment and disposition.


 (ROCK JACKSON MD)


Course & Med Decision Making


I assumed care of patient after comprehensive signout from off going physician


I am aware of patient as I have seen him multiple times in the past for alcohol 

withdrawal related visits.  History and presentation consistent with obvious 

alcohol intoxication without reported loss of consciousness and/or trauma


I reviewed entirety of ER work-up most concerning for elevated troponin and 

absence of any actual chest pain.  Reviewing patient's EKGs, does appear patient

 has nonspecific change in lead I and lateral wall T wave inversions


I contacted hospitalist, Dr. Mir and reviewed case.  He felt patient was 

appropriate for admission to [facility] for continued cardiac observation and 

ETOH/CIWA protocol in an alcohol dependence individual


I have updated patient on entirety of ER findings and need for hospitalization 

for which she was amenable.  All questions and concerns addressed prior to 

hospitalization


 (JAMES MARCUS DO)


Dragon Disclaimer


Dragon Disclaimer


This electronic medical record was generated, in whole or in part, using a voice

 recognition dictation system.


 (RCOK JACKSON MD)





Departure


Departure:


Impression:  


   Primary Impression:  


   Alcohol dependence


   Additional Impressions:  


   Alcohol withdrawal


   Elevated troponin


Disposition:  09 ADMITTED AS INPATIENT


Admitting Physician:  Loretta Mir


 (JAMES MARCUS DO)


Condition:  STABLE


Referrals:  


PCP,CHANO (PCP)








ABDI HOLT MD


Patient Instructions:  Alcohol Problems, Alcohol Withdrawal


Scripts


No Active Prescriptions or Reported Meds





Problem Qualifiers











ROCK JACKSON MD               Feb 13, 2022 03:48


JAMES MARCUS DO                 Feb 13, 2022 06:30

## 2022-02-13 NOTE — RAD
CT HEAD/BRAIN WO 



Date: 2/13/2022 5:59 AM 



Clinical Indication: alcohol w/u / Spl. Instructions:  / History: 



Comparison: None.



Technique:  5 mm axial tomographic images were obtained of the head without contrast. These were view
ed on brain and bone windows. One or more of the following dose reduction techniques were utilized: A
utomated exposure control (AEC), Adjustment of mA and/or kV according to patient size, Use of iterati
ve reconstruction technique such as ASiR, CT scan done according to ALARA and image gently/image wise
ly



Findings:



The brain parenchyma is normal in attenuation. No intra- or extra-axial mass or fluid collection. No 
acute hemorrhage. The ventricles are normal in size, shape, and morphology. The gray-white matter roxana
ction is normal. The subarachnoid cisterns are patent. 



The visualized paranasal sinuses are normal.  The visualized portions of the orbits and globes are no
rmal. The mastoid air cells are clear. 



The  topogram shows no lytic lesion or fracture. 



Impression:



No acute intracranial process.



Electronically signed by: Bonifacio Wright MD (2/13/2022 6:22 AM) [facility]

## 2022-02-13 NOTE — HP
DATE OF SERVICE: 2022

ADMIT DATE: 2022

HISTORY OF PRESENT ILLNESS:  The patient is a 46-year-old -American male 

patient who presented to the Emergency Room with nausea, vomiting for the last 

half day or so.  The patient states that he is alcoholic and drinks 

approximately 6-8 hard liquor drinks daily, states sometimes a little less and 

sometimes a little more.  States that this last drink was about 12 p.m. to 1 

p.m. yesterday afternoon and states that he has had this problem for a while and

has been in rehab 3 times and has quit temporarily, but always comes back.  He 

stated that he has also recently started smoking cigarettes again. Stated that 

he had about 6-7 episodes of nonbloody, nonbilious emesis and has not eaten 

anything really in 2 days or had anything to drink other than some alcohol and a

little bit of water. Stated that he is still making urine and stool normally for

him with no blood in either.  Denied any recent travel, trauma, illness, fever, 

chest pain, abdominal pain.  Denies any drug use.  He was extensively 

investigated and has had lab work that were mostly unremarkable except for 

elevated liver enzymes.  His urinalysis was essentially unremarkable.  Toxic 

screen was positive for blood alcohol of ____ mg/dL, negative for all other 

drugs.  His influenza A and B were negative and SARS-CoV-2 antigen rapid testing

was negative.  Did have a CT scan of the head, which basically showed the brain 

parenchyma is normal in attenuation.  No intra or extraaxial mass or fluid 

collection, no acute hemorrhage.  The ventricles are normal in size, shape, and 

morphology.  The gray-white matter junction is normal.  The subarachnoid 

cisterns are patent.  The visualized paranasal sinuses are normal.  The 

visualized portion of the orbits and globes are normal.  The mastoid air cells 

are clear.  The  topogram shows no lytic lesion or fracture.  His chest 

x-ray showed that the patient has no pulmonary masses or consolidation.  The 

tracheobronchial tree and hilar structures are normal.  There is no pleural 

effusion or pneumothorax.  Cardiomediastinal silhouette is normal.  The great 

vessels of thorax are normal.  The patient was admitted with alcohol dependence 

and alcohol withdrawal.  He has also elevated troponin and is known to have 

hypertension for which he has multiple antihypertensive medications, but has not

been taking his medications regularly.  He is also known to have hyperlipidemia.



PAST SURGICAL HISTORY:  Unremarkable.



ALLERGIES:  He has no known drug allergies.



MEDICATIONS:  He stated that he is supposed to be on amlodipine, lisinopril, 

carvedilol and atorvastatin, but he has not been taking them for a while now.



FAMILY HISTORY:  His father  at age of 74.  Mother is still alive and has 

diabetes, hypertension and fatty liver.



SOCIAL HISTORY:  He lives with his fiancee.  He does not have any children.  He 

continued to smoke and drinks between 2-2.5 pints of vodka.  Does not use any 

drugs.  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



REVIEW OF SYSTEMS:  The patient denied any blurring of vision, cataracts, 

glaucoma or macular degeneration.  Denied any earache, tinnitus or sensory 

deafness.  Denied any nosebleed, stuffy nose or postnasal drip.  Denied any sore

throat, sore tongue, toothache, hoarseness of voice or difficulty swallowing.  

However, he did complain of recurrent bouts of nausea, vomiting, but denied any 

hematemesis, melena or hematochezia.  Denied any dysuria, frequency or 

hematuria.  Denied any chest pain, shortness of breath, orthopnea, paroxysmal 

nocturnal dyspnea.  Denied any cough, phlegm or hemoptysis.



PHYSICAL EXAMINATION:

VITAL SIGNS:  On arrival to the Emergency Room, he was somewhat tachycardic with

a heart rate of 133, blood pressure of 159/117, his temperature was 98.1, 

respiratory rate was 18 and oxygen saturation was 94%.

HEAD, EYES, EARS, NOSE, AND THROAT:  Normocephalic, atraumatic.

NECK:  Supple.

HEART:  Showed normal first and second heart sounds.  No gallop, rub or murmur.

CHEST:  Clear to auscultation, no crepitation or rhonchi.

ABDOMEN:  Distended, soft, nontender.

NEUROLOGIC:  He was awake, alert, responding appropriately.  All cranial nerves 

intact.  He moves extremities without difficulty.  He ambulates without 

assistance or assistive devices.



LABORATORY DATA:  His lab work on arrival showed a white cell count of 6400, 

hemoglobin 16, hematocrit 48, MCV 89, platelet count 250,000 with a normal 

manual differential.  Serum sodium was 144, potassium 4.4, chloride 98, 

bicarbonate 16, anion gap of 30, BUN of 17, creatinine 1.2.  Estimated GFR was 

79 mL per minute.  His glucose was 97, calcium was 9.6, magnesium was 1.8.  

Total bilirubin, alkaline phosphatase were normal.  AST, ALT slightly elevated. 

His troponin was high at 229, total protein was 8.6, albumin was 4.8.  

Urinalysis essentially unremarkable.  Toxic screen was positive for blood 

alcohol of ____ mg/dL, was negative for other drugs.



ASSESSMENT AND PLAN:  In summary, this is a 46-year-old -American male 

patient with past medical history significant for alcohol dependence, 

hypertension, gastroesophageal reflux disease, anxiety. He was found to have 

also slightly elevated troponin.  His blood pressure is suboptimally controlled,

likely a manifestation of his withdrawal.  He has received a banana bag and was 

continued on alcohol withdrawal protocol.







SERGIO/BRISEIDA

DR: AMM/nts   DD: 2022 17:07

DT: 2022 17:38   TID: [id number]

## 2022-02-13 NOTE — PDOC
PROVIDER NOTE


Brief Cardiology Progress Note: 


-------------------------


Please refer to cardiac consultation note and recs from December 2021. 





S: 


Mr. Bowman presents again with alcohol abuse issues. Trop mildly elevated in the

setting of withdrawl, HTN and alcohol use. He denies chest pain. EKG is 

non-ischemia. 





O


Cardiovascular:  Normal heart sounds


Respiratory:  Normal breath sounds


Abdomen:  Soft, nontender, normal bowel sounds


Mental Status:  Alert, oriented X 3


Other:   





Labs reviewed/Meds reviewed. 





Impression: 


1. NSTEMI - Type 2


2. Alcohol abuse


3. HTN





Plan:


1. Continue withdrawl protocol


2. Depending on patient discharge plans, if still inpatient on tuesday, obtain 

echo given recurrent admissions to help patient know if he has alcoholic cmp, 

otherwise, patient will need to f/u with PCP and arrange for this on an outpt 

basis. 





Thanks





Justification of Admission:


Justification of Admission:


Justification of Admission Dx:  N/A











KIMO VIEIRA MD       Feb 13, 2022 23:26

## 2022-02-13 NOTE — EKG
[facility] ED

                    [facility] [address] [place] [id number]

Test Date:    2022               Test Time:    06:18:22

Pat Name:     KATELYN ZAMORA           Department:   

Patient ID:   [id number]           Room:          

Gender:       M                        Technician:   

:          [id number]               Requested By: ROCK JACKSON

Order Number: [id number]            Reading MD:   Emmanuel Grey MD

                                 Measurements

Intervals                              Axis          

Rate:         122                      P:            64

NJ:           138                      QRS:          37

QRSD:         86                       T:            37

QT:           312                                    

QTc:          446                                    

                           Interpretive Statements

SINUS TACHYCARDIA

NON-SPECIFIC ST/T CHANGES

Electronically Signed On 2022 13:05:19 CST by Emmanuel Grey MD

## 2022-02-13 NOTE — RAD
XR CHEST 1V 



INDICATION: alcohol w/u  . 



COMPARISON STUDY: 12/26/2021.



FINDINGS:



Lungs: Low lung volume. No pulmonary mass or consolidation. The tracheobronchial tree and hilar struc
tures are normal.



Pleura: No pleural effusion or pneumothorax.



Heart and Mediastinum: The cardiomediastinal silhouette is normal. The great vessels of the thorax ar
e normal.



IMPRESSION:  

No consolidation.



Electronically signed by: Bonifacio Wright MD (2/13/2022 6:24 AM) [facility]

## 2022-02-14 VITALS — DIASTOLIC BLOOD PRESSURE: 124 MMHG | SYSTOLIC BLOOD PRESSURE: 178 MMHG

## 2022-02-14 VITALS — DIASTOLIC BLOOD PRESSURE: 112 MMHG | SYSTOLIC BLOOD PRESSURE: 159 MMHG

## 2022-02-14 VITALS — DIASTOLIC BLOOD PRESSURE: 105 MMHG | SYSTOLIC BLOOD PRESSURE: 148 MMHG

## 2022-02-14 VITALS — SYSTOLIC BLOOD PRESSURE: 159 MMHG | DIASTOLIC BLOOD PRESSURE: 106 MMHG

## 2022-02-14 VITALS — SYSTOLIC BLOOD PRESSURE: 180 MMHG | DIASTOLIC BLOOD PRESSURE: 118 MMHG

## 2022-02-14 VITALS — SYSTOLIC BLOOD PRESSURE: 177 MMHG | DIASTOLIC BLOOD PRESSURE: 127 MMHG

## 2022-02-14 VITALS — DIASTOLIC BLOOD PRESSURE: 122 MMHG | SYSTOLIC BLOOD PRESSURE: 184 MMHG

## 2022-02-14 LAB
ALBUMIN SERPL-MCNC: 3.6 G/DL (ref 3.4–5)
ALBUMIN/GLOB SERPL: 1.3 {RATIO} (ref 1–1.7)
ALP SERPL-CCNC: 64 U/L (ref 46–116)
ALT SERPL-CCNC: 113 U/L (ref 16–63)
ANION GAP SERPL CALC-SCNC: 10 MMOL/L (ref 6–14)
AST SERPL-CCNC: 87 U/L (ref 15–37)
BASOPHILS # BLD AUTO: 0 X10^3/UL (ref 0–0.2)
BASOPHILS NFR BLD: 1 % (ref 0–3)
BILIRUB SERPL-MCNC: 1 MG/DL (ref 0.2–1)
BUN/CREAT SERPL: 9 (ref 6–20)
CA-I SERPL ISE-MCNC: 9 MG/DL (ref 8–26)
CALCIUM SERPL-MCNC: 8.7 MG/DL (ref 8.5–10.1)
CHLORIDE SERPL-SCNC: 107 MMOL/L (ref 98–107)
CO2 SERPL-SCNC: 26 MMOL/L (ref 21–32)
CREAT SERPL-MCNC: 1 MG/DL (ref 0.7–1.3)
EOSINOPHIL NFR BLD: 0.1 X10^3/UL (ref 0–0.7)
EOSINOPHIL NFR BLD: 2 % (ref 0–3)
ERYTHROCYTE [DISTWIDTH] IN BLOOD BY AUTOMATED COUNT: 15.5 % (ref 11.5–14.5)
GFR SERPLBLD BASED ON 1.73 SQ M-ARVRAT: 97.3 ML/MIN
GLOBULIN SER-MCNC: 2.7 G/DL (ref 2.2–3.8)
GLUCOSE SERPL-MCNC: 152 MG/DL (ref 70–99)
HCT VFR BLD CALC: 38.9 % (ref 39–53)
HGB BLD-MCNC: 12.9 G/DL (ref 13–17.5)
LYMPHOCYTES # BLD: 1.3 X10^3/UL (ref 1–4.8)
LYMPHOCYTES NFR BLD AUTO: 34 % (ref 24–48)
MAGNESIUM SERPL-MCNC: 1.6 MG/DL (ref 1.8–2.4)
MCH RBC QN AUTO: 30 PG (ref 25–35)
MCHC RBC AUTO-ENTMCNC: 33 G/DL (ref 31–37)
MCV RBC AUTO: 89 FL (ref 79–100)
MONO #: 0.2 X10^3/UL (ref 0–1.1)
MONOCYTES NFR BLD: 6 % (ref 0–9)
NEUT #: 2.1 X10^3UL (ref 1.8–7.7)
NEUTROPHILS NFR BLD AUTO: 58 % (ref 31–73)
PLATELET # BLD AUTO: 141 X10^3/UL (ref 140–400)
POTASSIUM SERPL-SCNC: 3.6 MMOL/L (ref 3.5–5.1)
PROT SERPL-MCNC: 6.3 G/DL (ref 6.4–8.2)
RBC # BLD AUTO: 4.37 X10^6/UL (ref 4.3–5.7)
SODIUM SERPL-SCNC: 143 MMOL/L (ref 136–145)
WBC # BLD AUTO: 3.7 X10^3/UL (ref 4–11)

## 2022-02-14 RX ADMIN — METOPROLOL TARTRATE SCH MG: 50 TABLET, FILM COATED ORAL at 16:22

## 2022-02-14 RX ADMIN — FOLIC ACID SCH MLS/HR: 5 INJECTION, SOLUTION INTRAMUSCULAR; INTRAVENOUS; SUBCUTANEOUS at 09:22

## 2022-02-14 RX ADMIN — METOPROLOL TARTRATE SCH MG: 50 TABLET, FILM COATED ORAL at 20:13

## 2022-02-14 NOTE — PDOC
DOMINIQUE STEPHENSON APRN 2/14/22 0846:


CARDIO Progress Notes


Date & Time


Date of Service


DATE: 2/14/22 


TIME: 08:40


Time of Evaluation


08:40





Subjective


Notes


no chest pain, shortness of breath.





Vitals


Vitals





Vital Signs








  Date Time  Temp Pulse Resp B/P (MAP) Pulse Ox O2 Delivery O2 Flow Rate FiO2


 


2/14/22 06:42 97.8 81 18 184/122 (142) 98 Room Air  








Weight


Weight [ ]





Input and Output


I.O.











Intake and Output 


 


 2/14/22





 07:00


 


Intake Total 440 ml


 


Output Total 1 ml


 


Balance 439 ml


 


 


 


Intake Oral 440 ml


 


Output Urine Total 1 ml











Laboratory


Labs





Laboratory Tests








Test


 2/13/22


04:20 2/13/22


04:33 2/13/22


06:24 2/13/22


06:45


 


White Blood Count


 6.4 x10^3/uL


(4.0-11.0) 


 


 





 


Red Blood Count


 5.38 x10^6/uL


(4.30-5.70) 


 


 





 


Hemoglobin


 16.1 g/dL


(13.0-17.5) 


 


 





 


Hematocrit


 47.9 %


(39.0-53.0) 


 


 





 


Mean Corpuscular Volume 89 fL ()    


 


Mean Corpuscular Hemoglobin 30 pg (25-35)    


 


Mean Corpuscular Hemoglobin


Concent 34 g/dL


(31-37) 


 


 





 


Red Cell Distribution Width


 15.9 %


(11.5-14.5) 


 


 





 


Platelet Count


 250 x10^3/uL


(140-400) 


 


 





 


Neutrophils (%) (Auto) 57 % (31-73)    


 


Lymphocytes (%) (Auto) 35 % (24-48)    


 


Monocytes (%) (Auto) 7 % (0-9)    


 


Eosinophils (%) (Auto) 0 % (0-3)    


 


Basophils (%) (Auto) 1 % (0-3)    


 


Neutrophils # (Auto)


 3.6 x10^3uL


(1.8-7.7) 


 


 





 


Lymphocytes # (Auto)


 2.2 x10^3/uL


(1.0-4.8) 


 


 





 


Monocytes # (Auto)


 0.5 x10^3/uL


(0.0-1.1) 


 


 





 


Eosinophils # (Auto)


 0.0 x10^3/uL


(0.0-0.7) 


 


 





 


Basophils # (Auto)


 0.1 x10^3/uL


(0.0-0.2) 


 


 





 


Sodium Level


 144 mmol/L


(136-145) 


 


 





 


Potassium Level


 4.4 mmol/L


(3.5-5.1) 


 


 





 


Chloride Level


 98 mmol/L


() 


 


 





 


Carbon Dioxide Level


 16 mmol/L


(21-32) 


 


 





 


Anion Gap 30 (6-14)    


 


Blood Urea Nitrogen


 17 mg/dL


(8-26) 


 


 





 


Creatinine


 1.2 mg/dL


(0.7-1.3) 


 


 





 


Estimated GFR


(Cockcroft-Gault) 78.9 


 


 


 





 


Glucose Level


 97 mg/dL


(70-99) 


 


 





 


Calcium Level


 9.6 mg/dL


(8.5-10.1) 


 


 





 


Magnesium Level


 1.8 mg/dL


(1.8-2.4) 


 


 





 


Troponin I High Sensitivity


 229 ng/L


(4-75) 


 


 





 


Ethyl Alcohol Level


 


 43 mg/dL


(0-10) 


 





 


Total Bilirubin


 


 


 1.1 mg/dL


(0.2-1.0) 





 


Direct Bilirubin


 


 


 0.2 mg/dL


(0.0-0.2) 





 


Aspartate Amino Transf


(AST/SGOT) 


 


 92 U/L (15-37) 


 





 


Alanine Aminotransferase


(ALT/SGPT) 


 


 154 U/L


(16-63) 





 


Alkaline Phosphatase


 


 


 87 U/L


() 





 


Total Protein


 


 


 8.6 g/dL


(6.4-8.2) 





 


Albumin


 


 


 4.8 g/dL


(3.4-5.0) 





 


Coronavirus (COVID-19)(PCR)


 


 


 


 Not detected


(NOT DETECTD)


 


Influenza Type A (Rapid)


 


 


 


 Negative


(NEGATIVE)


 


Influenza Type B (Rapid)


 


 


 


 Negative


(NEGATIVE)


 


SARS-CoV-2 Antigen (Rapid)


 


 


 


 Negative


(NEGATIVE)


 


Test


 2/13/22


13:12 2/13/22


21:49 


 





 


Urine Collection Type Unknown    


 


Urine Color Yellow    


 


Urine Clarity Clear    


 


Urine pH 6.0    


 


Urine Specific Gravity >=1.030    


 


Urine Protein


 100 mg/dl


(NEG-TRACE) 


 


 





 


Urine Glucose (UA)


 Neg mg/dL


(NEG) 


 


 





 


Urine Ketones (Stick)


 >=160 mg/dL


(NEG) 


 


 





 


Urine Blood Trace (NEG)    


 


Urine Nitrite Neg (NEG)    


 


Urine Bilirubin Neg (NEG)    


 


Urine Urobilinogen Dipstick


 0.2 mg/dL (0.2


mg/dL) 


 


 





 


Urine Leukocyte Esterase Neg (NEG)    


 


Urine RBC 1-2 /HPF (0-2)    


 


Urine WBC 1-4 /HPF (0-4)    


 


Urine Squamous Epithelial


Cells Few /LPF 


 


 


 





 


Urine Bacteria 0 /HPF (0-FEW)    


 


Urine Mucus Slight /LPF    


 


Urine Opiates Screen Neg (NEG)    


 


Urine Methadone Screen Neg (NEG)    


 


Urine Barbiturates Neg (NEG)    


 


Urine Phencyclidine Screen Neg (NEG)    


 


Urine


Amphetamine/Methamphetamine Neg (NEG) 


 


 


 





 


Urine Benzodiazepines Screen Neg (NEG)    


 


Urine Cocaine Screen Neg (NEG)    


 


Urine Cannabinoids Screen Neg (NEG)    


 


Urine Ethyl Alcohol Pos (NEG)    


 


Glucose (Fingerstick)


 


 148 mg/dL


(70-99) 


 














Physical Exams


HEENT:  Neck Supple W Full Motion


Chest:  Symmetric


Lungs:  Clear to Auscultation


Heart:  RRR


Abdomen:  Soft N/T


Extremities:  No Edema


Neurology:  alert, oriented, follow commands





Assessment


Assessment


1.  Nausea/vomiting, withdrawal with h/o chronic alcohol abuse. as per IM


2.  Slight troponin elevation; most probably type II, demand ischemia. Will 

conducted echo tomorrow if still here. Otherwise, can be conducted on an 

outpatient basis 


2.  Accelerated hypertension; improved, but remains elevated. Add lisinopril


3.  Hyperlipidemia; no statin with elevated LFTs


4.  Elevated LFTs


5.  LORENZO Bassett MD 2/14/22 1736:


CARDIO Progress Notes


Assessment


Assessment


Patient seen and evaluated.


He reports feeling mildly better.


I agree with our nurse practitioners assessment and plan.


Nausea/vomiting, withdrawal with h/o chronic alcohol abuse.  Improving. 


Slight troponin elevation; most probably type II, demand ischemia. Will 

conducted echo tomorrow if still here. Otherwise, can be conducted on an 

outpatient basis 


Accelerated hypertension; improved, but remains elevated.  Lisinopril added.


Hyperlipidemia; no statin with elevated LFTs


Elevated LFTs


DOMINIQUE Mathews           Feb 14, 2022 08:46


LORENZO GUARDADO MD            Feb 14, 2022 17:36

## 2022-02-14 NOTE — PN
DATE: 02/14/2022

SUBJECTIVE:  The patient continued to be somewhat shaky.  His blood pressure 

continued to be extremely high, although ___ known to have hypertension.



PHYSICAL EXAMINATION:

GENERAL:  When I examined him, he looked well and was clearly in no apparent 

respiratory distress.  There is no pallor, jaundice, cyanosis or thyromegaly.  

No jugular venous distention.  No limb edema.

VITAL SIGNS:  His heart rate was 86, blood pressure is 180/118, temperature was 

98.4, respiratory rate 20, and oxygen saturation was 95% on room air.

HEAD, EYES, EARS, NOSE AND THROAT:  Normocephalic, atraumatic.

NECK:  Supple.

HEART:  Showed normal first and second heart sounds.  No gallop, rub or murmur.

CHEST:  Clear to auscultation, no crepitation or rhonchi.

ABDOMEN:  Distended, soft, nontender, no guarding or rigidity.  No organomegaly.

 All hernial orifice intact.  Bowel sounds normal.

NEUROLOGIC:  He was grossly intact.



LABORATORY DATA:  His lab work this morning showed white cell count of 3700, 

hemoglobin 13, hematocrit 39, MCV 89 and platelet count of 141,000.  His 

chemistry showed a serum sodium 143, potassium 3.6, chloride 107, bicarbonate 

26, anion gap of 10, BUN 9, creatinine 1, estimated GFR was 97 mL per minute.  

His glucose 152, calcium was 8.7, magnesium was 1.6.  Total bilirubin and 

alkaline phosphatase are normal.  AST, ALT slightly elevated, but trending down.

 His ammonia is normal at 32.  Total protein 6.3, albumin 3.6.  His prothrombin 

time and INR are normal.  His COVID-19 by PCR was not detectable and his 

influenza A and B were negative.



ASSESSMENT:

1.  Alcoholism and alcohol withdrawal protocol.

2.  Accelerated hypertension.

3.  Slight troponin elevation, felt to be type 2 demand ischemia.

4.  Hyperlipidemia.

5.  Mild transaminitis.

6.  Tobaccoism.



PLAN:  My plan is to start him on metoprolol as well as increase his lisinopril 

to 10 mg and will discharge him tomorrow.







AMM/MAR

DR: AMM/chey   DD: 02/14/2022 16:11

DT: 02/14/2022 21:41   TID: [id number]

## 2022-02-15 VITALS — SYSTOLIC BLOOD PRESSURE: 154 MMHG | DIASTOLIC BLOOD PRESSURE: 103 MMHG

## 2022-02-15 VITALS — DIASTOLIC BLOOD PRESSURE: 117 MMHG | SYSTOLIC BLOOD PRESSURE: 176 MMHG

## 2022-02-15 VITALS — DIASTOLIC BLOOD PRESSURE: 102 MMHG | SYSTOLIC BLOOD PRESSURE: 152 MMHG

## 2022-02-15 VITALS — SYSTOLIC BLOOD PRESSURE: 186 MMHG | DIASTOLIC BLOOD PRESSURE: 114 MMHG

## 2022-02-15 RX ADMIN — FOLIC ACID SCH MLS/HR: 5 INJECTION, SOLUTION INTRAMUSCULAR; INTRAVENOUS; SUBCUTANEOUS at 10:40

## 2022-02-15 RX ADMIN — METOPROLOL TARTRATE SCH MG: 50 TABLET, FILM COATED ORAL at 07:59

## 2022-02-15 NOTE — CARD
MR#: [id number]

Account#: [id number]

Accession#: [id number]

Date of Study: 02/15/2022

Ordering Physician: DOMINIQUE STEPHENSON, 

Referring Physician: DOMINIQUE STEPHENSON, 

Tech: Lakisha Cabrera, [facility]





--------------- APPROVED REPORT --------------





EXAM: Two-dimensional and M-mode echocardiogram with Doppler and color Doppler.



Other Information 

Quality : GoodHR: 72bpm



INDICATION

Chest Pain 

Elevated Troponin



RISK FACTORS

Hypertension 

Smoking 



2D DIMENSIONS 

Left Atrium(2D)3.2 (1.6-4.0cm)IVSd1.3 (0.7-1.1cm)

Aortic Root(2D)3.4 (2.0-3.7cm)LVDd5.7 (3.9-5.9cm)

LVOT Diameter2.2 (1.8-2.4cm)PWd1.3 (0.7-1.1cm)

LVDs4.1 (2.5-4.0cm)FS (%) 28.0 %

SV84.5 mlLVEF(%)53.5 (>50%)



Aortic Valve

AoV Peak Farshad.160.3cm/sAoV VTI28.1cm

AO Peak GR.9.9mmHgLVOT Peak Farshad.146.8cm/s

LVOT  VTI 25.08cmAO Mean GR.6mmHg

JANET (VMAX)3.50tp8VUK   (VTI)3.25cm2



Mitral Valve

MV E Fhrftspl92.9cm/sMV E Peak Gr.2mmHg

MV DECEL FTAG248paPS A Zkubwuxf15.3cm/s

MV E Mean Gr.1mmHgE/A  Ratio0.7



Pulmonary Valve

PV Peak Yzfrlwwu72.6cm/sPV Peak Grad.3mmHg



Tricuspid Valve

TR P. Ufxtszbw607ec/sRAP [id number]

TR Peak Gr.45ynVeAVNV20snLx



 LEFT VENTRICLE 

The left ventricle is normal size. There is moderate concentric left ventricular hypertrophy. The lef
t ventricular systolic function is normal and the ejection fraction is within normal range. The Eject
ion Fraction is 50-55%. There is normal LV segmental wall motion. Transmitral Doppler flow pattern is
 Grade I-abnormal relaxation pattern.



 RIGHT VENTRICLE 

The right ventricle is mildly dilated. There is normal right ventricular wall thickness. The right ve
ntricular systolic function is normal.



 ATRIA 

The left atrium size is normal. The right atrium is mildly dilated. The interatrial septum is intact 
with no evidence for an atrial septal defect or patent foramen ovale as noted on 2-D or Doppler imagi
ng.



 AORTIC VALVE 

The aortic valve is normal in structure and function. Doppler and Color Flow revealed no significant 
aortic regurgitation. There is no significant aortic valvular stenosis. Calculated aortic valve area 
is 3.3 cm2 with maximum pressure gradient of 11 mmHg and mean pressure gradient of 6 mmHg.



 MITRAL VALVE 

The mitral valve is normal in structure and function. There is no evidence of mitral valve prolapse. 
There is no mitral valve stenosis. Doppler and Color Flow revealed no mitral valve regurgitation note
d.



 TRICUSPID VALVE 

The tricuspid valve is normal in structure and function. Doppler and Color Flow revealed trace tricus
pid regurgitation with an estimated PAP 21 mmHg. There is no tricuspid valve stenosis.



 PULMONIC VALVE 

Doppler and Color Flow revealed no pulmonic valvular regurgitation. There is no pulmonic valvular lucie
nosis.



 GREAT VESSELS 

The aortic root is normal in size. The ascending aorta is normal in size. The IVC is normal in size a
nd collapses >50% with inspiration.



 PERICARDIAL EFFUSION 

There is no evidence of significant pericardial effusion.



Critical Notification

Critical Value: No



<Conclusion>

The left ventricular systolic function is normal and the ejection fraction is within normal range. Th
e Ejection Fraction is 50-55%.

There is normal LV segmental wall motion.

There is moderate concentric left ventricular hypertrophy.



Signed by : Emmanuel Grey, 

Electronically Approved : 02/15/2022 11:16:07

## 2022-02-15 NOTE — PDOC
CARDIO Progress Notes


Date & Time


Date of Service


DATE: 2/15/22 


TIME: 08:41


Time of Evaluation


08:41





Subjective


Notes


No chest pain, palpitations





Vitals


Vitals





Vital Signs








  Date Time  Temp Pulse Resp B/P (MAP) Pulse Ox O2 Delivery O2 Flow Rate FiO2


 


2/15/22 08:00  68  154/103    


 


2/15/22 05:40 97.9  16  96 Room Air  








Weight


Weight [ ]





Input and Output


I.O.











Intake and Output 


 


 2/15/22





 07:00


 


Intake Total 2757 ml


 


Output Total 3 ml


 


Balance 2754 ml


 


 


 


Intake Oral 1750 ml


 


IV Total 1007 ml


 


Output Urine Total 3 ml


 


# Voids 2











Laboratory


Labs





Laboratory Tests








Test


 2/13/22


13:12 2/13/22


21:49 2/14/22


09:15


 


Urine Collection Type Unknown   


 


Urine Color Yellow   


 


Urine Clarity Clear   


 


Urine pH 6.0   


 


Urine Specific Gravity >=1.030   


 


Urine Protein


 100 mg/dl


(NEG-TRACE) 


 





 


Urine Glucose (UA)


 Neg mg/dL


(NEG) 


 





 


Urine Ketones (Stick)


 >=160 mg/dL


(NEG) 


 





 


Urine Blood Trace (NEG)   


 


Urine Nitrite Neg (NEG)   


 


Urine Bilirubin Neg (NEG)   


 


Urine Urobilinogen Dipstick


 0.2 mg/dL (0.2


mg/dL) 


 





 


Urine Leukocyte Esterase Neg (NEG)   


 


Urine RBC 1-2 /HPF (0-2)   


 


Urine WBC 1-4 /HPF (0-4)   


 


Urine Squamous Epithelial


Cells Few /LPF 


 


 





 


Urine Bacteria 0 /HPF (0-FEW)   


 


Urine Mucus Slight /LPF   


 


Urine Opiates Screen Neg (NEG)   


 


Urine Methadone Screen Neg (NEG)   


 


Urine Barbiturates Neg (NEG)   


 


Urine Phencyclidine Screen Neg (NEG)   


 


Urine


Amphetamine/Methamphetamine Neg (NEG) 


 


 





 


Urine Benzodiazepines Screen Neg (NEG)   


 


Urine Cocaine Screen Neg (NEG)   


 


Urine Cannabinoids Screen Neg (NEG)   


 


Urine Ethyl Alcohol Pos (NEG)   


 


Glucose (Fingerstick)


 


 148 mg/dL


(70-99) 





 


White Blood Count


 


 


 3.7 x10^3/uL


(4.0-11.0)


 


Red Blood Count


 


 


 4.37 x10^6/uL


(4.30-5.70)


 


Hemoglobin


 


 


 12.9 g/dL


(13.0-17.5)


 


Hematocrit


 


 


 38.9 %


(39.0-53.0)


 


Mean Corpuscular Volume   89 fL () 


 


Mean Corpuscular Hemoglobin   30 pg (25-35) 


 


Mean Corpuscular Hemoglobin


Concent 


 


 33 g/dL


(31-37)


 


Red Cell Distribution Width


 


 


 15.5 %


(11.5-14.5)


 


Platelet Count


 


 


 141 x10^3/uL


(140-400)


 


Neutrophils (%) (Auto)   58 % (31-73) 


 


Lymphocytes (%) (Auto)   34 % (24-48) 


 


Monocytes (%) (Auto)   6 % (0-9) 


 


Eosinophils (%) (Auto)   2 % (0-3) 


 


Basophils (%) (Auto)   1 % (0-3) 


 


Neutrophils # (Auto)


 


 


 2.1 x10^3uL


(1.8-7.7)


 


Lymphocytes # (Auto)


 


 


 1.3 x10^3/uL


(1.0-4.8)


 


Monocytes # (Auto)


 


 


 0.2 x10^3/uL


(0.0-1.1)


 


Eosinophils # (Auto)


 


 


 0.1 x10^3/uL


(0.0-0.7)


 


Basophils # (Auto)


 


 


 0.0 x10^3/uL


(0.0-0.2)


 


Prothrombin Time


 


 


 10.7 SEC


(9.4-11.4)


 


Prothromb Time International


Ratio 


 


 1.0 (0.9-1.1) 





 


Sodium Level


 


 


 143 mmol/L


(136-145)


 


Potassium Level


 


 


 3.6 mmol/L


(3.5-5.1)


 


Chloride Level


 


 


 107 mmol/L


()


 


Carbon Dioxide Level


 


 


 26 mmol/L


(21-32)


 


Anion Gap   10 (6-14) 


 


Blood Urea Nitrogen   9 mg/dL (8-26) 


 


Creatinine


 


 


 1.0 mg/dL


(0.7-1.3)


 


Estimated GFR


(Cockcroft-Gault) 


 


 97.3 





 


BUN/Creatinine Ratio   9 (6-20) 


 


Glucose Level


 


 


 152 mg/dL


(70-99)


 


Calcium Level


 


 


 8.7 mg/dL


(8.5-10.1)


 


Magnesium Level


 


 


 1.6 mg/dL


(1.8-2.4)


 


Total Bilirubin


 


 


 1.0 mg/dL


(0.2-1.0)


 


Aspartate Amino Transf


(AST/SGOT) 


 


 87 U/L (15-37) 





 


Alanine Aminotransferase


(ALT/SGPT) 


 


 113 U/L


(16-63)


 


Alkaline Phosphatase


 


 


 64 U/L


()


 


Ammonia


 


 


 32 mcmol/L


(11-34)


 


Total Protein


 


 


 6.3 g/dL


(6.4-8.2)


 


Albumin


 


 


 3.6 g/dL


(3.4-5.0)


 


Albumin/Globulin Ratio   1.3 (1.0-1.7) 











Physical Exams


HEENT:  Neck Supple W Full Motion


Chest:  Symmetric


Lungs:  Clear to Auscultation


Heart:  RRR


Abdomen:  Soft N/T


Extremities:  No Edema


Neurology:  alert, oriented, follow commands





Assessment


Assessment


1.  Nausea/vomiting, withdrawal with h/o chronic alcohol abuse. as per IM


2.  Slight troponin elevation; most probably type II, demand ischemia. Echo 

today 


2.  Accelerated hypertension; improved


3.  Hyperlipidemia; no statin with elevated LFTs


4.  Elevated LFTs


5.  Tobaccosim











DOMINIQUE STEPHENSON           Feb 15, 2022 08:42

## 2022-05-26 ENCOUNTER — HOSPITAL ENCOUNTER (EMERGENCY)
Dept: HOSPITAL 63 - ER | Age: 47
Discharge: HOME | End: 2022-05-26
Payer: SELF-PAY

## 2022-05-26 VITALS — WEIGHT: 211.86 LBS | HEIGHT: 73 IN | BODY MASS INDEX: 28.08 KG/M2

## 2022-05-26 VITALS — DIASTOLIC BLOOD PRESSURE: 92 MMHG | SYSTOLIC BLOOD PRESSURE: 156 MMHG

## 2022-05-26 DIAGNOSIS — S02.66XA: Primary | ICD-10-CM
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
## 2022-05-26 DIAGNOSIS — F10.230: ICD-10-CM

## 2022-05-26 DIAGNOSIS — M19.90: ICD-10-CM

## 2022-05-26 DIAGNOSIS — I10: ICD-10-CM
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
## 2022-05-26 DIAGNOSIS — S41.152A: ICD-10-CM
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
## 2022-05-26 DIAGNOSIS — K21.9: ICD-10-CM
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
## 2022-05-26 DIAGNOSIS — F41.9: ICD-10-CM
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
## 2022-05-26 DIAGNOSIS — F17.210: ICD-10-CM

## 2022-05-26 LAB
ALBUMIN SERPL-MCNC: 4.2 G/DL (ref 3.4–5)
ALBUMIN/GLOB SERPL: 1.1 {RATIO} (ref 1–1.7)
ALP SERPL-CCNC: 87 U/L (ref 46–116)
ALT SERPL-CCNC: 119 U/L (ref 16–63)
AMPHETAMINE/METHAMPHETAMINE: (no result)
ANION GAP SERPL CALC-SCNC: 21 MMOL/L (ref 6–14)
APTT PPP: (no result) S
AST SERPL-CCNC: 101 U/L (ref 15–37)
BACTERIA #/AREA URNS HPF: 0 /HPF
BARBITURATES UR-MCNC: (no result) UG/ML
BASOPHILS # BLD AUTO: 0.1 X10^3/UL (ref 0–0.2)
BASOPHILS NFR BLD: 1 % (ref 0–3)
BENZODIAZ UR-MCNC: (no result) UG/L
BILIRUB SERPL-MCNC: 1 MG/DL (ref 0.2–1)
BUN/CREAT SERPL: 13 (ref 6–20)
CA-I SERPL ISE-MCNC: 13 MG/DL (ref 8–26)
CALCIUM SERPL-MCNC: 9.5 MG/DL (ref 8.5–10.1)
CANNABINOIDS UR-MCNC: (no result) UG/L
CHLORIDE SERPL-SCNC: 96 MMOL/L (ref 98–107)
CO2 SERPL-SCNC: 21 MMOL/L (ref 21–32)
COCAINE UR-MCNC: (no result) NG/ML
CREAT SERPL-MCNC: 1 MG/DL (ref 0.7–1.3)
EOSINOPHIL NFR BLD: 0 % (ref 0–3)
EOSINOPHIL NFR BLD: 0 X10^3/UL (ref 0–0.7)
ERYTHROCYTE [DISTWIDTH] IN BLOOD BY AUTOMATED COUNT: 18 % (ref 11.5–14.5)
FIBRINOGEN PPP-MCNC: CLEAR MG/DL
GFR SERPLBLD BASED ON 1.73 SQ M-ARVRAT: 97.3 ML/MIN
GLOBULIN SER-MCNC: 4 G/DL (ref 2.2–3.8)
GLUCOSE SERPL-MCNC: 118 MG/DL (ref 70–99)
GLUCOSE UR STRIP-MCNC: (no result) MG/DL
HCT VFR BLD CALC: 42.1 % (ref 39–53)
HGB BLD-MCNC: 14.2 G/DL (ref 13–17.5)
HYALINE CASTS #/AREA URNS LPF: (no result) /HPF
LYMPHOCYTES # BLD: 1.6 X10^3/UL (ref 1–4.8)
LYMPHOCYTES NFR BLD AUTO: 28 % (ref 24–48)
MCH RBC QN AUTO: 32 PG (ref 25–35)
MCHC RBC AUTO-ENTMCNC: 34 G/DL (ref 31–37)
MCV RBC AUTO: 94 FL (ref 79–100)
METHADONE SERPL-MCNC: (no result) NG/ML
MONO #: 0.5 X10^3/UL (ref 0–1.1)
MONOCYTES NFR BLD: 9 % (ref 0–9)
NEUT #: 3.5 X10^3UL (ref 1.8–7.7)
NEUTROPHILS NFR BLD AUTO: 62 % (ref 31–73)
NITRITE UR QL STRIP: (no result)
OPIATES UR-MCNC: (no result) NG/ML
PCP SERPL-MCNC: (no result) MG/DL
PLATELET # BLD AUTO: 202 X10^3/UL (ref 140–400)
POTASSIUM SERPL-SCNC: 3.8 MMOL/L (ref 3.5–5.1)
PROT SERPL-MCNC: 8.2 G/DL (ref 6.4–8.2)
RBC # BLD AUTO: 4.48 X10^6/UL (ref 4.3–5.7)
RBC #/AREA URNS HPF: (no result) /HPF (ref 0–2)
SODIUM SERPL-SCNC: 138 MMOL/L (ref 136–145)
SP GR UR STRIP: >=1.03
SQUAMOUS #/AREA URNS LPF: (no result) /LPF
UROBILINOGEN UR-MCNC: 1 MG/DL
WBC # BLD AUTO: 5.7 X10^3/UL (ref 4–11)
WBC #/AREA URNS HPF: (no result) /HPF (ref 0–4)

## 2022-05-26 PROCEDURE — 70450 CT HEAD/BRAIN W/O DYE: CPT

## 2022-05-26 PROCEDURE — 81001 URINALYSIS AUTO W/SCOPE: CPT

## 2022-05-26 PROCEDURE — 36415 COLL VENOUS BLD VENIPUNCTURE: CPT

## 2022-05-26 PROCEDURE — 99285 EMERGENCY DEPT VISIT HI MDM: CPT

## 2022-05-26 PROCEDURE — 85025 COMPLETE CBC W/AUTO DIFF WBC: CPT

## 2022-05-26 PROCEDURE — 96365 THER/PROPH/DIAG IV INF INIT: CPT

## 2022-05-26 PROCEDURE — 70486 CT MAXILLOFACIAL W/O DYE: CPT

## 2022-05-26 PROCEDURE — 80307 DRUG TEST PRSMV CHEM ANLYZR: CPT

## 2022-05-26 PROCEDURE — 80053 COMPREHEN METABOLIC PANEL: CPT

## 2022-05-26 PROCEDURE — 96375 TX/PRO/DX INJ NEW DRUG ADDON: CPT
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
